# Patient Record
Sex: MALE | Race: BLACK OR AFRICAN AMERICAN | NOT HISPANIC OR LATINO | URBAN - METROPOLITAN AREA
[De-identification: names, ages, dates, MRNs, and addresses within clinical notes are randomized per-mention and may not be internally consistent; named-entity substitution may affect disease eponyms.]

---

## 2017-08-12 PROBLEM — Z00.00 ENCOUNTER FOR PREVENTIVE HEALTH EXAMINATION: Status: ACTIVE | Noted: 2017-08-12

## 2017-08-21 ENCOUNTER — OUTPATIENT (OUTPATIENT)
Dept: OUTPATIENT SERVICES | Facility: HOSPITAL | Age: 47
LOS: 1 days | End: 2017-08-21
Payer: COMMERCIAL

## 2017-08-21 VITALS
DIASTOLIC BLOOD PRESSURE: 94 MMHG | WEIGHT: 227.96 LBS | OXYGEN SATURATION: 97 % | SYSTOLIC BLOOD PRESSURE: 138 MMHG | TEMPERATURE: 98 F | RESPIRATION RATE: 18 BRPM | HEART RATE: 73 BPM | HEIGHT: 71 IN

## 2017-08-21 DIAGNOSIS — G47.33 OBSTRUCTIVE SLEEP APNEA (ADULT) (PEDIATRIC): ICD-10-CM

## 2017-08-21 DIAGNOSIS — Z01.818 ENCOUNTER FOR OTHER PREPROCEDURAL EXAMINATION: ICD-10-CM

## 2017-08-21 DIAGNOSIS — N47.1 PHIMOSIS: ICD-10-CM

## 2017-08-21 LAB
HCT VFR BLD CALC: 43.8 % — SIGNIFICANT CHANGE UP (ref 39–50)
HGB BLD-MCNC: 14.2 G/DL — SIGNIFICANT CHANGE UP (ref 13–17)
HIV 1+2 AB+HIV1 P24 AG SERPL QL IA: SIGNIFICANT CHANGE UP
MCHC RBC-ENTMCNC: 29 PG — SIGNIFICANT CHANGE UP (ref 27–34)
MCHC RBC-ENTMCNC: 32.4 GM/DL — SIGNIFICANT CHANGE UP (ref 32–36)
MCV RBC AUTO: 89.4 FL — SIGNIFICANT CHANGE UP (ref 80–100)
PLATELET # BLD AUTO: 307 K/UL — SIGNIFICANT CHANGE UP (ref 150–400)
RBC # BLD: 4.9 M/UL — SIGNIFICANT CHANGE UP (ref 4.2–5.8)
RBC # FLD: 13.7 % — SIGNIFICANT CHANGE UP (ref 10.3–14.5)
WBC # BLD: 8.53 K/UL — SIGNIFICANT CHANGE UP (ref 3.8–10.5)
WBC # FLD AUTO: 8.53 K/UL — SIGNIFICANT CHANGE UP (ref 3.8–10.5)

## 2017-08-21 PROCEDURE — 87389 HIV-1 AG W/HIV-1&-2 AB AG IA: CPT

## 2017-08-21 PROCEDURE — G0463: CPT

## 2017-08-21 PROCEDURE — 85027 COMPLETE CBC AUTOMATED: CPT

## 2017-08-21 RX ORDER — CEFAZOLIN SODIUM 1 G
2000 VIAL (EA) INJECTION ONCE
Qty: 0 | Refills: 0 | Status: DISCONTINUED | OUTPATIENT
Start: 2017-08-28 | End: 2017-09-12

## 2017-08-21 NOTE — H&P PST ADULT - NSANTHOSAYNRD_GEN_A_CORE
No. FELICITY screening performed.  STOP BANG Legend: 0-2 = LOW Risk; 3-4 = INTERMEDIATE Risk; 5-8 = HIGH Risk

## 2017-08-21 NOTE — H&P PST ADULT - NEUROLOGICAL DETAILS
sensation intact/alert and oriented x 3/responds to pain/responds to verbal commands/normal strength

## 2017-08-21 NOTE — H&P PST ADULT - HISTORY OF PRESENT ILLNESS
47 year old male presents for circumcision. Pt. reports experiencing foreskin irritation over the past few months.

## 2017-08-28 ENCOUNTER — OUTPATIENT (OUTPATIENT)
Dept: OUTPATIENT SERVICES | Facility: HOSPITAL | Age: 47
LOS: 1 days | End: 2017-08-28
Payer: COMMERCIAL

## 2017-08-28 ENCOUNTER — RESULT REVIEW (OUTPATIENT)
Age: 47
End: 2017-08-28

## 2017-08-28 VITALS
DIASTOLIC BLOOD PRESSURE: 72 MMHG | RESPIRATION RATE: 16 BRPM | OXYGEN SATURATION: 99 % | SYSTOLIC BLOOD PRESSURE: 110 MMHG | HEART RATE: 76 BPM

## 2017-08-28 VITALS
HEART RATE: 79 BPM | TEMPERATURE: 99 F | RESPIRATION RATE: 18 BRPM | SYSTOLIC BLOOD PRESSURE: 124 MMHG | HEIGHT: 71 IN | DIASTOLIC BLOOD PRESSURE: 82 MMHG | OXYGEN SATURATION: 98 % | WEIGHT: 229.94 LBS

## 2017-08-28 DIAGNOSIS — N47.1 PHIMOSIS: ICD-10-CM

## 2017-08-28 PROCEDURE — 88304 TISSUE EXAM BY PATHOLOGIST: CPT | Mod: 26

## 2017-08-28 PROCEDURE — 54160 CIRCUMCISION NEONATE: CPT

## 2017-08-28 PROCEDURE — 88304 TISSUE EXAM BY PATHOLOGIST: CPT

## 2017-08-28 RX ORDER — OXYCODONE AND ACETAMINOPHEN 5; 325 MG/1; MG/1
1 TABLET ORAL
Qty: 20 | Refills: 0
Start: 2017-08-28

## 2017-08-28 RX ORDER — SODIUM CHLORIDE 9 MG/ML
1000 INJECTION INTRAMUSCULAR; INTRAVENOUS; SUBCUTANEOUS
Qty: 0 | Refills: 0 | Status: DISCONTINUED | OUTPATIENT
Start: 2017-08-28 | End: 2017-09-12

## 2017-08-28 RX ORDER — SODIUM CHLORIDE 9 MG/ML
3 INJECTION INTRAMUSCULAR; INTRAVENOUS; SUBCUTANEOUS EVERY 8 HOURS
Qty: 0 | Refills: 0 | Status: DISCONTINUED | OUTPATIENT
Start: 2017-08-28 | End: 2017-08-28

## 2017-08-28 RX ORDER — FENTANYL CITRATE 50 UG/ML
50 INJECTION INTRAVENOUS
Qty: 0 | Refills: 0 | Status: DISCONTINUED | OUTPATIENT
Start: 2017-08-28 | End: 2017-08-28

## 2017-08-28 RX ORDER — LIDOCAINE HCL 20 MG/ML
0.2 VIAL (ML) INJECTION ONCE
Qty: 0 | Refills: 0 | Status: DISCONTINUED | OUTPATIENT
Start: 2017-08-28 | End: 2017-08-28

## 2017-08-28 RX ORDER — ONDANSETRON 8 MG/1
4 TABLET, FILM COATED ORAL ONCE
Qty: 0 | Refills: 0 | Status: DISCONTINUED | OUTPATIENT
Start: 2017-08-28 | End: 2017-08-28

## 2017-08-28 RX ADMIN — SODIUM CHLORIDE 125 MILLILITER(S): 9 INJECTION INTRAMUSCULAR; INTRAVENOUS; SUBCUTANEOUS at 23:03

## 2017-08-28 NOTE — ASU DISCHARGE PLAN (ADULT/PEDIATRIC). - MEDICATION SUMMARY - MEDICATIONS TO TAKE
I will START or STAY ON the medications listed below when I get home from the hospital:    Percocet 5/325 oral tablet  -- 1 tab(s) by mouth every 6 hours MDD:6 tabs  -- Caution federal law prohibits the transfer of this drug to any person other  than the person for whom it was prescribed.  May cause drowsiness.  Alcohol may intensify this effect.  Use care when operating dangerous machinery.  This prescription cannot be refilled.  This product contains acetaminophen.  Do not use  with any other product containing acetaminophen to prevent possible liver damage.  Using more of this medication than prescribed may cause serious breathing problems.    -- Indication: For For severe pain.

## 2017-08-30 ENCOUNTER — TRANSCRIPTION ENCOUNTER (OUTPATIENT)
Age: 47
End: 2017-08-30

## 2017-08-30 LAB — SURGICAL PATHOLOGY STUDY: SIGNIFICANT CHANGE UP

## 2017-11-02 ENCOUNTER — EMERGENCY (EMERGENCY)
Facility: HOSPITAL | Age: 47
LOS: 1 days | Discharge: HOME | End: 2017-11-02
Attending: EMERGENCY MEDICINE | Admitting: EMERGENCY MEDICINE
Payer: COMMERCIAL

## 2017-11-02 VITALS
DIASTOLIC BLOOD PRESSURE: 91 MMHG | RESPIRATION RATE: 16 BRPM | SYSTOLIC BLOOD PRESSURE: 145 MMHG | OXYGEN SATURATION: 100 % | TEMPERATURE: 98 F | HEART RATE: 90 BPM

## 2017-11-02 PROCEDURE — 73562 X-RAY EXAM OF KNEE 3: CPT | Mod: 26,RT

## 2017-11-02 PROCEDURE — 99285 EMERGENCY DEPT VISIT HI MDM: CPT

## 2017-11-02 RX ORDER — OXYCODONE AND ACETAMINOPHEN 5; 325 MG/1; MG/1
1 TABLET ORAL ONCE
Qty: 0 | Refills: 0 | Status: DISCONTINUED | OUTPATIENT
Start: 2017-11-02 | End: 2017-11-02

## 2017-11-02 RX ORDER — OXYCODONE AND ACETAMINOPHEN 5; 325 MG/1; MG/1
1 TABLET ORAL
Qty: 15 | Refills: 0
Start: 2017-11-02 | End: 2017-11-05

## 2017-11-02 RX ADMIN — OXYCODONE AND ACETAMINOPHEN 1 TABLET(S): 5; 325 TABLET ORAL at 11:42

## 2017-11-02 NOTE — ED PROVIDER NOTE - CARE PLAN
Instructions for follow-up, activity and diet:	Rest, keep extremity elevated whenever possible  Apply ice to affected area 15 min on/15 min off  Use ACE/splint/sling while active  Continue Indomethacin as directed  Take Percocet   Follow up with your PMD within 2-3 days  Return to the ER for worsening or fevers, chills. Principal Discharge DX:	Knee effusion  Instructions for follow-up, activity and diet:	Rest, keep extremity elevated whenever possible  Apply ice to affected area 15 min on/15 min off  Use ACE/splint/sling while active  Continue Indomethacin as directed  Take Percocet 1 tab every 6 hours as needed for severe pain  Follow up with your PMD within 2-3 days  Follow up with orthopedics- list provided  Return to the ER for worsening    If you have issues obtaining follow up, please call: [phone] to obtain a doctor or specialist who takes your insurance in your area. Principal Discharge DX:	Knee effusion  Instructions for follow-up, activity and diet:	Rest, keep extremity elevated whenever possible  Apply ice to affected area 15 min on/15 min off  Use ACE/splint/sling while active  Continue Indomethacin as directed  Take Percocet 1 tab every 6 hours as needed for severe pain  Follow up with your PMD within 2-3 days  Follow up with orthopedics- list provided  Return to the ER for worsening    If you have issues obtaining follow up, please call: [phone] to obtain a doctor or specialist who takes your insurance in your area.  Secondary Diagnosis:	Knee pain, unspecified chronicity, unspecified laterality

## 2017-11-02 NOTE — ED PROVIDER NOTE - PROGRESS NOTE DETAILS
ADRIANNA Taylor: Received signout and discussed plan with QUID attending. Xrays negative for fx, +joint effusion, pain well controlled. +rom. Stable for d/c home with ortho followup, ace, continue indomethacin and percocet. Pt amenable with plan. ADRIANNA Taylor: Received signout and discussed plan with QUID attending. Xrays negative for fx, +joint effusion, pain well controlled. +rom, effusion. No erythema/warmth. Stable for d/c home with ortho followup, ace, continue indomethacin and percocet. Pt amenable with plan.

## 2017-11-02 NOTE — ED ADULT TRIAGE NOTE - CHIEF COMPLAINT QUOTE
pt c/o swelling and pain to right knee x 2 weeks, worse since Sunday, seen in Urgent care on Tuesday given Indomethacin, not feeling better

## 2017-11-02 NOTE — ED PROVIDER NOTE - PLAN OF CARE
Rest, keep extremity elevated whenever possible  Apply ice to affected area 15 min on/15 min off  Use ACE/splint/sling while active  Continue Indomethacin as directed  Take Percocet   Follow up with your PMD within 2-3 days  Return to the ER for worsening or fevers, chills. Rest, keep extremity elevated whenever possible  Apply ice to affected area 15 min on/15 min off  Use ACE/splint/sling while active  Continue Indomethacin as directed  Take Percocet 1 tab every 6 hours as needed for severe pain  Follow up with your PMD within 2-3 days  Follow up with orthopedics- list provided  Return to the ER for worsening    If you have issues obtaining follow up, please call: [phone] to obtain a doctor or specialist who takes your insurance in your area.

## 2017-11-02 NOTE — ED PROVIDER NOTE - MEDICAL DECISION MAKING DETAILS
48 y/o M w/ right knee pain and swelling s/p recent trauma. Vital signs stable. Will check knee XRs, Ace wrap, walking aids, percocet and ortho follow up. 48 y/o M w/ right knee pain and swelling s/p recent trauma, no warmth or erythema, no systemic symptoms, able to range passively. Vital signs stable. Will check knee XRs, Ace wrap, walking aids, pain ctrl and ortho follow up.

## 2017-11-02 NOTE — ED PROVIDER NOTE - OBJECTIVE STATEMENT
48 y/o M w/ PMHx of gout, presents to the ED c/o right knee pain and swelling x5 days. Pt states he woke up 4 days ago w/ right knee swelling. Pt notes he banged his knee on the dashboard in his car 1 week ago. Pt was seen at urgent care 2 days ago for fever and right knee pain, no fever at urgent care. Pt has been dx w/ gout in the past. Endorses use of Indomethacin w/ no relief. Denies fever, chills, numbness/tingling, weakness or any other complaints. [facility]: [id number] [facility] [place] [address] 46 y/o M w/ PMHx of gout, presents to the ED c/o right knee pain and swelling x5 days. Pt states he hit his knee on the dash in the car, experienced severe pain at time but was able to ambulate and pain improved. Woke up 4 days ago w/ right knee swelling and pain which has been progressively owrsening.  Pt was seen at urgent care 2 days ago for right knee pain, rx indomethacin. Pt has been dx w/ gout in the past but states this feels different as it is not warm or red, when he has gout, it usually is. Endorses use of Indomethacin w/ no relief. Denies fever, chills, numbness/tingling, weakness or any other complaints. [facility]: [id number] [facility] [place] [address]

## 2017-11-02 NOTE — ED PROVIDER NOTE - LOWER EXTREMITY EXAM, RIGHT
right knee w/ suprapatellar effusion, no overlying erythema, minimal warmth, unable to range actively secondary to pain, able to range passively w/ pain, negative anterior and posterior drawer, extensor mechanism intact, neurovascularly intact, D.P. pulses normal, sensation intact, no patellar tenderness right knee w/ suprapatellar effusion, active rom limited by pain, able to passively range fully with pain, no overlying erythema, minimal warmth, negative anterior and posterior drawer, extensor mechanism intact, neurovascularly intact, D.P. pulses normal, sensation intact, no patellar tenderness

## 2019-09-12 ENCOUNTER — EMERGENCY (EMERGENCY)
Facility: HOSPITAL | Age: 49
LOS: 1 days | Discharge: HOME | End: 2019-09-12
Attending: EMERGENCY MEDICINE | Admitting: EMERGENCY MEDICINE
Payer: COMMERCIAL

## 2019-09-12 VITALS
TEMPERATURE: 98 F | RESPIRATION RATE: 17 BRPM | HEART RATE: 92 BPM | DIASTOLIC BLOOD PRESSURE: 99 MMHG | OXYGEN SATURATION: 97 % | SYSTOLIC BLOOD PRESSURE: 159 MMHG

## 2019-09-12 PROCEDURE — 99283 EMERGENCY DEPT VISIT LOW MDM: CPT

## 2019-09-12 NOTE — ED ADULT TRIAGE NOTE - CHIEF COMPLAINT QUOTE
Pt states he had lower left wisdom toothed extracted today. Pt states bleeding has not stopped bleeding since 6pm. Pt actively bleeding in ED. Pt denies sob/dizziness/AC usage Pt states he had lower left wisdom toothed extracted today. Pt states incision has not stopped bleeding since 6pm. Pt actively bleeding in ED. Pt denies sob/dizziness/AC usage Pt states he had lower left wisdom tooth extracted today. Pt states incision has not stopped bleeding since 6pm. Pt actively bleeding in ED. Pt denies sob/dizziness/AC usage

## 2019-09-13 VITALS
HEART RATE: 81 BPM | SYSTOLIC BLOOD PRESSURE: 141 MMHG | DIASTOLIC BLOOD PRESSURE: 86 MMHG | RESPIRATION RATE: 16 BRPM | OXYGEN SATURATION: 100 % | TEMPERATURE: 98 F

## 2019-09-13 PROBLEM — M10.9 GOUT, UNSPECIFIED: Chronic | Status: ACTIVE | Noted: 2017-08-21

## 2019-09-13 RX ORDER — TRANEXAMIC ACID 100 MG/ML
5 INJECTION, SOLUTION INTRAVENOUS ONCE
Refills: 0 | Status: COMPLETED | OUTPATIENT
Start: 2019-09-13 | End: 2019-09-13

## 2019-09-13 RX ADMIN — TRANEXAMIC ACID 5 MILLILITER(S): 100 INJECTION, SOLUTION INTRAVENOUS at 04:03

## 2019-09-13 NOTE — ED PROVIDER NOTE - OBJECTIVE STATEMENT
49 Y M with no pmhx had L lower wisdom tooth extraction tonight. He states that a few hours ago started bleeding and could not get it to start. Pt is not taking any blood thinners.

## 2019-09-13 NOTE — PROGRESS NOTE ADULT - SUBJECTIVE AND OBJECTIVE BOX
Patient is a 49y old  Male who presents with a chief complaint of continued bleeding following extraction of #17 (lower left wisdom tooth) at approximately 5pm on 9/12/19    HPI: 48 y/o male states he had lower left wisdom tooth extracted 9/12 at approximately 5pm. Patient states he went home and extraction site did not stop bleeding. Pt actively bleeding in ED. Pt denies sob/dizziness/AC usage. Patient denies daily use of aspirin or other blood thinners.    PAST MEDICAL & SURGICAL HISTORY:  Gout  No significant past surgical history    MEDICATIONS  (STANDING): None    MEDICATIONS  (PRN): None    Allergies: No Known Allergies    FAMILY HISTORY: No pertinent family history in first degree relatives    *SOCIAL HISTORY: Patient states he is an occasional tobacco user    *Last Dental Visit: 9/12/19    Vital Signs Last 24 Hrs  T(C): 36.7 (12 Sep 2019 23:22), Max: 36.7 (12 Sep 2019 23:22)  T(F): 98.1 (12 Sep 2019 23:22), Max: 98.1 (12 Sep 2019 23:22)  HR: 80 (13 Sep 2019 04:37) (80 - 92)  BP: 186/120 (13 Sep 2019 04:37) (159/99 - 186/120)  BP(mean): --  RR: 16 (13 Sep 2019 04:37) (16 - 17)  SpO2: 99% (13 Sep 2019 04:37) (97% - 100%)    EOE:  TMJ (-) clicks                    ( -) pops                    ( - ) crepitus             Mandible FROM             Facial bones and MOM grossly intact             ( - ) trismus             ( - ) LAD             ( - ) swelling             ( - ) asymmetry             ( - ) palpation             ( - ) SOB             ( - ) dysphagia             ( - ) LOC    IOE:  permanent dentition: grossly intact           hard/soft palate:  ( - ) palatal torus           tongue/FOM WNL           labial/buccal mucosa WNL           ( - ) percussion           ( +  ) palpation. Extraction site #17 tender to palpation           ( - ) swelling     *DENTAL RADIOGRAPHS: None taken at this consult    ASSESSMENT: #17 extraction site active bleeding    PROCEDURE:  Patient states he does not remember any complications during extraction. Patient states he was instructed to use gauze pressure to maintain hemostasis. Patient states that around 6PM, site began actively bleeding and he was unable to achieve hemostasis and presented to ED. ED Resident stated that lidocaine with epi was administered, TXA, and gauze pressure with continued active bleeding and not being able to achieve hemostasis. Verbal consent provided. Limited clinical exam performed. Liver clot was observed in extraction site and irrigated out. Site #17 was irrigated with copious amount of sterile saline. Collagen plug placed in extraction site, 3-0 chromic gut suture used to place 1 figure 8 suture and 1 interrupted suture. Gauze pressure was applied. Hemostasis achieved. All without complications.    Patient was instructed to apply pressure for 20-30 minutes and change gauze every 30 minutes for the following 2 hours.  Advised patient to not expectorate forcefully, use straws, smoke or drink alcohol for 3 days. Advised patient the sutures will dissolve in 7 - 10 days. Soft food diet for 3 days. Advised pt to follow up with dentist who performed extraction. All questions answered and patient understands to maintain gauze pressure.     RECOMMENDATIONS:  1) Monitor patient for 2 hours in ED  2) Dental F/U with outpatient dentist who performed extraction   3) If bleeding continues or new symptoms arises, return to ED.     Keisha Yang DDS, #[id number]  Neyda Putnam DMD, #[id number]

## 2019-09-13 NOTE — ED PROVIDER NOTE - CLINICAL SUMMARY MEDICAL DECISION MAKING FREE TEXT BOX
49 Y M with bleeding from wisdom tooth extraction site, will place guaze with epi, if controlled will DC if not will attempt TXA.

## 2019-09-13 NOTE — ED PROVIDER NOTE - NSFOLLOWUPINSTRUCTIONS_ED_ALL_ED_FT
1) Please follow up with your dentist in 1-2 days.    2) Please avoid ibuprofen or naproxen for pain control. Please use tylenol and follow up with your dentist if your pain does not improve.     3) If you develop worsening bleeding, please return to the ED.

## 2019-09-13 NOTE — ED ADULT NURSE NOTE - CHIEF COMPLAINT QUOTE
Pt states he had lower left wisdom tooth extracted today. Pt states incision has not stopped bleeding since 6pm. Pt actively bleeding in ED. Pt denies sob/dizziness/AC usage

## 2019-09-13 NOTE — ED PROVIDER NOTE - PLAN OF CARE
You were bleeding from the site of your wisdom tooth extraction. You were seen by the dental team who placed 2 sutures with improvement in your bleeding. Please follow up with your dentist in 2 days. Please return to the ED if you develop increased bleeding.

## 2019-09-13 NOTE — ED ADULT NURSE NOTE - OBJECTIVE STATEMENT
Patient presents for oral bleeding since 1800 yesterday, patient reports tooth extraction left side wisdom tooth on bottom removed. Currently bleeding. Denies n/v/Dizziness/weakness. Took naproxen colchicine today. Hx Gout

## 2019-09-13 NOTE — ED PROVIDER NOTE - CARE PLAN
Principal Discharge DX:	Dental disorder Principal Discharge DX:	Dental disorder  Assessment and plan of treatment:	You were bleeding from the site of your wisdom tooth extraction. You were seen by the dental team who placed 2 sutures with improvement in your bleeding. Please follow up with your dentist in 2 days. Please return to the ED if you develop increased bleeding.

## 2019-09-13 NOTE — ED PROVIDER NOTE - PATIENT PORTAL LINK FT
You can access the FollowMyHealth Patient Portal offered by [facility] by registering at the following website: [email or web address]. By joining [facility]’s FollowMyHealth portal, you will also be able to view your health information using other applications (apps) compatible with our system.

## 2019-09-13 NOTE — ED PROVIDER NOTE - ATTENDING CONTRIBUTION TO CARE
HPI:  49 Y M with no pmhx had L lower wisdom tooth extraction tonight. He states that a few hours ago started bleeding and could not get it to start. Pt is not taking any blood thinners. No other complaints. No diff breathing, no diff swallowing, no hoarse voice, no F/C/N/V.   EXAM: NAD, gauze in mouth, left lower molar missing with pooling dark blood, no pus, no abscess. no stridor, speaking full sentences, lungs ctab, abd soft nontender.   MDM: concern for dental bleeding, not on blood thinners. Will trial epinephrine soaked gauze vs TXA soaked gauze. if not stopping will consult dental.

## 2019-09-13 NOTE — ED PROVIDER NOTE - PROGRESS NOTE DETAILS
Resident: Luis Briscoe Pt said that bleeding had been controlled but then he started bleeding again. Some clots in mouth but not with new active bleeding as well. Will pack with txa and re-evaluate again. Resident: Luis Small - Pt now feels like is rebleeding again, injected several locations with lido with epi, repacked area. If still bleeding in 20 min will need to call dental. Resident: Luis Small - Spoke with dental since pt is still bleeding. They are coming in and will be here about 730AM. The resident who is coming in is holding pager [phone] Dusty PGY3: Dental at bedside to evaluate patient. Dusty PGY3: Dental irrigated and placed 2 resorbable sutures. Will watch for 2 hours and if no further bleeding can DC with outpatient dental follow up.

## 2019-10-07 ENCOUNTER — APPOINTMENT (OUTPATIENT)
Dept: ORTHOPEDIC SURGERY | Facility: CLINIC | Age: 49
End: 2019-10-07

## 2021-09-29 NOTE — ED ADULT NURSE NOTE - NS PRO PASSIVE SMOKE EXP
Neymar and mom came into clinic for his monthly testosterone injection.  Medication was brought from home. 0.25 mL of testosterone enanthate injected into his left thigh, utilized pain ease spray, patient tolerated injection well, band-aid applied and discharged home with mom.     Lot: [id number]  EXP: 07/22  
Unknown

## 2023-07-11 ENCOUNTER — INPATIENT (INPATIENT)
Facility: HOSPITAL | Age: 53
LOS: 1 days | Discharge: HOME | End: 2023-07-13
Attending: HOSPITALIST | Admitting: HOSPITALIST
Payer: COMMERCIAL

## 2023-07-11 VITALS
DIASTOLIC BLOOD PRESSURE: 84 MMHG | TEMPERATURE: 98 F | SYSTOLIC BLOOD PRESSURE: 128 MMHG | RESPIRATION RATE: 20 BRPM | HEART RATE: 91 BPM | OXYGEN SATURATION: 99 %

## 2023-07-11 DIAGNOSIS — K92.1 MELENA: ICD-10-CM

## 2023-07-11 LAB
ALBUMIN SERPL ELPH-MCNC: 4.6 G/DL — SIGNIFICANT CHANGE UP (ref 3.3–5)
ALP SERPL-CCNC: 72 U/L — SIGNIFICANT CHANGE UP (ref 40–120)
ALT FLD-CCNC: 19 U/L — SIGNIFICANT CHANGE UP (ref 4–41)
ANION GAP SERPL CALC-SCNC: 16 MMOL/L — HIGH (ref 7–14)
APTT BLD: 30 SEC — SIGNIFICANT CHANGE UP (ref 27–36.3)
AST SERPL-CCNC: 19 U/L — SIGNIFICANT CHANGE UP (ref 4–40)
BASOPHILS # BLD AUTO: 0.04 K/UL — SIGNIFICANT CHANGE UP (ref 0–0.2)
BASOPHILS NFR BLD AUTO: 0.4 % — SIGNIFICANT CHANGE UP (ref 0–2)
BILIRUB SERPL-MCNC: <0.2 MG/DL — SIGNIFICANT CHANGE UP (ref 0.2–1.2)
BLD GP AB SCN SERPL QL: NEGATIVE — SIGNIFICANT CHANGE UP
BUN SERPL-MCNC: 13 MG/DL — SIGNIFICANT CHANGE UP (ref 7–23)
CALCIUM SERPL-MCNC: 9.5 MG/DL — SIGNIFICANT CHANGE UP (ref 8.4–10.5)
CHLORIDE SERPL-SCNC: 101 MMOL/L — SIGNIFICANT CHANGE UP (ref 98–107)
CO2 SERPL-SCNC: 22 MMOL/L — SIGNIFICANT CHANGE UP (ref 22–31)
CREAT SERPL-MCNC: 0.85 MG/DL — SIGNIFICANT CHANGE UP (ref 0.5–1.3)
EGFR: 105 ML/MIN/1.73M2 — SIGNIFICANT CHANGE UP
EOSINOPHIL # BLD AUTO: 0.2 K/UL — SIGNIFICANT CHANGE UP (ref 0–0.5)
EOSINOPHIL NFR BLD AUTO: 2.1 % — SIGNIFICANT CHANGE UP (ref 0–6)
GLUCOSE BLDC GLUCOMTR-MCNC: 138 MG/DL — HIGH (ref 70–99)
GLUCOSE SERPL-MCNC: 139 MG/DL — HIGH (ref 70–99)
HCT VFR BLD CALC: 31.3 % — LOW (ref 39–50)
HGB BLD-MCNC: 8.2 G/DL — LOW (ref 13–17)
IANC: 5.03 K/UL — SIGNIFICANT CHANGE UP (ref 1.8–7.4)
IMM GRANULOCYTES NFR BLD AUTO: 0.3 % — SIGNIFICANT CHANGE UP (ref 0–0.9)
INR BLD: 1.03 RATIO — SIGNIFICANT CHANGE UP (ref 0.88–1.16)
LYMPHOCYTES # BLD AUTO: 3.6 K/UL — HIGH (ref 1–3.3)
LYMPHOCYTES # BLD AUTO: 37.4 % — SIGNIFICANT CHANGE UP (ref 13–44)
MCHC RBC-ENTMCNC: 17.8 PG — LOW (ref 27–34)
MCHC RBC-ENTMCNC: 26.2 GM/DL — LOW (ref 32–36)
MCV RBC AUTO: 67.9 FL — LOW (ref 80–100)
MONOCYTES # BLD AUTO: 0.72 K/UL — SIGNIFICANT CHANGE UP (ref 0–0.9)
MONOCYTES NFR BLD AUTO: 7.5 % — SIGNIFICANT CHANGE UP (ref 2–14)
NEUTROPHILS # BLD AUTO: 5.03 K/UL — SIGNIFICANT CHANGE UP (ref 1.8–7.4)
NEUTROPHILS NFR BLD AUTO: 52.3 % — SIGNIFICANT CHANGE UP (ref 43–77)
NRBC # BLD: 0 /100 WBCS — SIGNIFICANT CHANGE UP (ref 0–0)
NRBC # FLD: 0.02 K/UL — HIGH (ref 0–0)
OB PNL STL: NEGATIVE — SIGNIFICANT CHANGE UP
PLATELET # BLD AUTO: 395 K/UL — SIGNIFICANT CHANGE UP (ref 150–400)
POTASSIUM SERPL-MCNC: 3.6 MMOL/L — SIGNIFICANT CHANGE UP (ref 3.5–5.3)
POTASSIUM SERPL-SCNC: 3.6 MMOL/L — SIGNIFICANT CHANGE UP (ref 3.5–5.3)
PROT SERPL-MCNC: 7.1 G/DL — SIGNIFICANT CHANGE UP (ref 6–8.3)
PROTHROM AB SERPL-ACNC: 11.9 SEC — SIGNIFICANT CHANGE UP (ref 10.5–13.4)
RBC # BLD: 4.61 M/UL — SIGNIFICANT CHANGE UP (ref 4.2–5.8)
RBC # FLD: 20.3 % — HIGH (ref 10.3–14.5)
RH IG SCN BLD-IMP: POSITIVE — SIGNIFICANT CHANGE UP
SODIUM SERPL-SCNC: 139 MMOL/L — SIGNIFICANT CHANGE UP (ref 135–145)
WBC # BLD: 9.62 K/UL — SIGNIFICANT CHANGE UP (ref 3.8–10.5)
WBC # FLD AUTO: 9.62 K/UL — SIGNIFICANT CHANGE UP (ref 3.8–10.5)

## 2023-07-11 PROCEDURE — 99285 EMERGENCY DEPT VISIT HI MDM: CPT

## 2023-07-11 RX ORDER — PANTOPRAZOLE SODIUM 20 MG/1
80 TABLET, DELAYED RELEASE ORAL ONCE
Refills: 0 | Status: COMPLETED | OUTPATIENT
Start: 2023-07-11 | End: 2023-07-11

## 2023-07-11 RX ADMIN — PANTOPRAZOLE SODIUM 80 MILLIGRAM(S): 20 TABLET, DELAYED RELEASE ORAL at 20:42

## 2023-07-11 NOTE — ED ADULT NURSE NOTE - NSFALLUNIVINTERV_ED_ALL_ED
Bed/Stretcher in lowest position, wheels locked, appropriate side rails in place/Call bell, personal items and telephone in reach/Instruct patient to call for assistance before getting out of bed/chair/stretcher/Non-slip footwear applied when patient is off stretcher/[place] to call system/Physically safe environment - no spills, clutter or unnecessary equipment/Purposeful proactive rounding/Room/bathroom lighting operational, light cord in reach

## 2023-07-11 NOTE — ED PROVIDER NOTE - OBJECTIVE STATEMENT
52 yo M PMHx of HTN, DM presents with anemia on outpatient labs. Patient was found to have hemoglobin of 7.8 on recent labs by PCP. Patient endorses having melena for last several weeks, worse when patient takes ibuprofen for gout. Patient is not on any blood thinners. He does not endorse any abdominal pain currently. No nausea, vomiting, fevers. He sometimes has left sided abdominal pain with defecation. He is an active 30-40 pack year smoker. He also drinks 3-4 drinks per night. Family hx of prostate and lung cancer.

## 2023-07-11 NOTE — PATIENT PROFILE ADULT - NSPROHMSYMPCOND_GEN_A_NUR
Detail Level: Detailed
Quality 226: Preventive Care And Screening: Tobacco Use: Screening And Cessation Intervention: Patient screened for tobacco use and is an ex/non-smoker
Quality 130: Documentation Of Current Medications In The Medical Record: Current Medications Documented
Quality 431: Preventive Care And Screening: Unhealthy Alcohol Use - Screening: Patient not identified as an unhealthy alcohol user when screened for unhealthy alcohol use using a systematic screening method
cardiovascular

## 2023-07-11 NOTE — ED ADULT NURSE NOTE - HOW OFTEN DO YOU HAVE A DRINK CONTAINING ALCOHOL?
Pt mother requesting to leave prior to being treated or seen by an ERP. Pt leaved triage in good condition. ER AMA form obtained at this time.   
no chills
Never

## 2023-07-11 NOTE — ED ADULT NURSE NOTE - AS PAIN REST
Impression: Tear film insufficiency of bilateral lacrimal glands: H04.123.  Plan: AFTS QID+, warm compresses daily 0 (no pain/absence of nonverbal indicators of pain)

## 2023-07-11 NOTE — ED PROVIDER NOTE - CLINICAL SUMMARY MEDICAL DECISION MAKING FREE TEXT BOX
52 yo M PMHx of HTN, DM, active smoker, ETOH use presents with low hgb and melena. No current abdominal tenderness. Will get labs, give protonix, admit for GI workup.

## 2023-07-11 NOTE — ED ADULT NURSE NOTE - OBJECTIVE STATEMENT
Pt received to rm27 , awake and alert, A&OX4, ambulatory. Reporting bloody stools over the last few weeks and outpt Hgb of 7.8. Reports SANDOVAL over the last few days. Respirations even and unlabored. No hx of anemia of blood transfusion. Denies CP, SOB, N/V, HA, dizziness, palpitations, blurry vision. 20G IV placed to  L AC . Bed in lowest position, call bell within reach. Safety maintained.

## 2023-07-11 NOTE — ED ADULT TRIAGE NOTE - CHIEF COMPLAINT QUOTE
Pt arrives from MD office. Pt sent for a hgb of 7.8 and to r/o GI bleed. Pt denies any blood thinner use.

## 2023-07-11 NOTE — ED PROVIDER NOTE - ATTENDING CONTRIBUTION TO CARE
No risk alerts present 53-year-old male past medical history hypertension, diabetes, daily alcohol use, chronic intermittent bloody stools presents for hemoglobin of 7.8.  Does report shortness of breath more recently.  He denies any abdominal pain nausea vomiting.  Also does report history of H. pylori in the past unclear if it was recent diagnosis or status posttreatment.  GI Dr. Satish mAin.  Patient does also smoke 1 pack/day.  He denies any chest pain, fevers, chills.  Exam as above  Rectal exam without evidence of gross blood.  EKG normal sinus rhythm 94 bpm normal axis, normal intervals, no significant ST elevation depression; no evidence of acute ischemia.  Patient with dark stools and anemia given history of alcohol use, possible H. pylori, concern for upper GI bleed.  Plan labs, PPI IV, transfuse as needed, admit.

## 2023-07-11 NOTE — PATIENT PROFILE ADULT - FUNCTIONAL ASSESSMENT - DAILY ACTIVITY 5.
Gabe Nazario is a 72 year old male presenting with a blood pressure check.    Do you monitor at home? yes    Patient states that his reading have been trending upward. 167-180/80's    Last BP: 140/82  Today's BP: 170/90  Home Cuff: 165/96    Current medications for this: None at this time    Any of the following sx: chest pain, palpitations, headaches, SOB, dizziness? SOB    Patient states that he has some SOB every since getting sick at the end of January.   4 = No assist / stand by assistance

## 2023-07-12 DIAGNOSIS — I10 ESSENTIAL (PRIMARY) HYPERTENSION: ICD-10-CM

## 2023-07-12 DIAGNOSIS — D62 ACUTE POSTHEMORRHAGIC ANEMIA: ICD-10-CM

## 2023-07-12 DIAGNOSIS — F10.10 ALCOHOL ABUSE, UNCOMPLICATED: ICD-10-CM

## 2023-07-12 DIAGNOSIS — K92.1 MELENA: ICD-10-CM

## 2023-07-12 DIAGNOSIS — E78.5 HYPERLIPIDEMIA, UNSPECIFIED: ICD-10-CM

## 2023-07-12 DIAGNOSIS — M10.9 GOUT, UNSPECIFIED: ICD-10-CM

## 2023-07-12 DIAGNOSIS — Z29.9 ENCOUNTER FOR PROPHYLACTIC MEASURES, UNSPECIFIED: ICD-10-CM

## 2023-07-12 DIAGNOSIS — E11.65 TYPE 2 DIABETES MELLITUS WITH HYPERGLYCEMIA: ICD-10-CM

## 2023-07-12 DIAGNOSIS — D64.9 ANEMIA, UNSPECIFIED: ICD-10-CM

## 2023-07-12 LAB
FERRITIN SERPL-MCNC: 10 NG/ML — LOW (ref 30–400)
GLUCOSE BLDC GLUCOMTR-MCNC: 141 MG/DL — HIGH (ref 70–99)
GLUCOSE BLDC GLUCOMTR-MCNC: 141 MG/DL — HIGH (ref 70–99)
GLUCOSE BLDC GLUCOMTR-MCNC: 152 MG/DL — HIGH (ref 70–99)
GLUCOSE BLDC GLUCOMTR-MCNC: 153 MG/DL — HIGH (ref 70–99)
GLUCOSE BLDC GLUCOMTR-MCNC: 179 MG/DL — HIGH (ref 70–99)
HCT VFR BLD CALC: 30.8 % — LOW (ref 39–50)
HGB BLD-MCNC: 8.1 G/DL — LOW (ref 13–17)
IRON SATN MFR SERPL: 33 UG/DL — LOW (ref 45–165)
IRON SATN MFR SERPL: 6 % — LOW (ref 14–50)
MCHC RBC-ENTMCNC: 17.8 PG — LOW (ref 27–34)
MCHC RBC-ENTMCNC: 26.3 GM/DL — LOW (ref 32–36)
MCV RBC AUTO: 67.7 FL — LOW (ref 80–100)
NRBC # BLD: 0 /100 WBCS — SIGNIFICANT CHANGE UP (ref 0–0)
NRBC # FLD: 0.02 K/UL — HIGH (ref 0–0)
PLATELET # BLD AUTO: 405 K/UL — HIGH (ref 150–400)
RBC # BLD: 4.55 M/UL — SIGNIFICANT CHANGE UP (ref 4.2–5.8)
RBC # FLD: 19.9 % — HIGH (ref 10.3–14.5)
TIBC SERPL-MCNC: 565 UG/DL — HIGH (ref 220–430)
UIBC SERPL-MCNC: 532 UG/DL — HIGH (ref 110–370)
WBC # BLD: 8.44 K/UL — SIGNIFICANT CHANGE UP (ref 3.8–10.5)
WBC # FLD AUTO: 8.44 K/UL — SIGNIFICANT CHANGE UP (ref 3.8–10.5)

## 2023-07-12 PROCEDURE — 99223 1ST HOSP IP/OBS HIGH 75: CPT

## 2023-07-12 PROCEDURE — 99222 1ST HOSP IP/OBS MODERATE 55: CPT | Mod: GC

## 2023-07-12 PROCEDURE — 12345: CPT | Mod: NC,GC

## 2023-07-12 PROCEDURE — 99233 SBSQ HOSP IP/OBS HIGH 50: CPT

## 2023-07-12 RX ORDER — ALLOPURINOL 300 MG
1 TABLET ORAL
Refills: 0 | DISCHARGE

## 2023-07-12 RX ORDER — FERROUS SULFATE 325(65) MG
325 TABLET ORAL DAILY
Refills: 0 | Status: DISCONTINUED | OUTPATIENT
Start: 2023-07-12 | End: 2023-07-12

## 2023-07-12 RX ORDER — LANOLIN ALCOHOL/MO/W.PET/CERES
3 CREAM (GRAM) TOPICAL AT BEDTIME
Refills: 0 | Status: DISCONTINUED | OUTPATIENT
Start: 2023-07-12 | End: 2023-07-13

## 2023-07-12 RX ORDER — ALLOPURINOL 300 MG
300 TABLET ORAL DAILY
Refills: 0 | Status: DISCONTINUED | OUTPATIENT
Start: 2023-07-12 | End: 2023-07-13

## 2023-07-12 RX ORDER — DEXTROSE 50 % IN WATER 50 %
15 SYRINGE (ML) INTRAVENOUS ONCE
Refills: 0 | Status: DISCONTINUED | OUTPATIENT
Start: 2023-07-12 | End: 2023-07-13

## 2023-07-12 RX ORDER — AMLODIPINE BESYLATE 2.5 MG/1
1 TABLET ORAL
Refills: 0 | DISCHARGE

## 2023-07-12 RX ORDER — FOLIC ACID 0.8 MG
1 TABLET ORAL DAILY
Refills: 0 | Status: DISCONTINUED | OUTPATIENT
Start: 2023-07-12 | End: 2023-07-13

## 2023-07-12 RX ORDER — DEXTROSE 50 % IN WATER 50 %
12.5 SYRINGE (ML) INTRAVENOUS ONCE
Refills: 0 | Status: DISCONTINUED | OUTPATIENT
Start: 2023-07-12 | End: 2023-07-13

## 2023-07-12 RX ORDER — GLUCAGON INJECTION, SOLUTION 0.5 MG/.1ML
1 INJECTION, SOLUTION SUBCUTANEOUS ONCE
Refills: 0 | Status: DISCONTINUED | OUTPATIENT
Start: 2023-07-12 | End: 2023-07-13

## 2023-07-12 RX ORDER — INSULIN LISPRO 100/ML
VIAL (ML) SUBCUTANEOUS
Refills: 0 | Status: DISCONTINUED | OUTPATIENT
Start: 2023-07-12 | End: 2023-07-13

## 2023-07-12 RX ORDER — ACETAMINOPHEN 500 MG
650 TABLET ORAL EVERY 6 HOURS
Refills: 0 | Status: DISCONTINUED | OUTPATIENT
Start: 2023-07-12 | End: 2023-07-13

## 2023-07-12 RX ORDER — AMLODIPINE BESYLATE 2.5 MG/1
5 TABLET ORAL DAILY
Refills: 0 | Status: DISCONTINUED | OUTPATIENT
Start: 2023-07-12 | End: 2023-07-12

## 2023-07-12 RX ORDER — DEXTROSE 50 % IN WATER 50 %
25 SYRINGE (ML) INTRAVENOUS ONCE
Refills: 0 | Status: DISCONTINUED | OUTPATIENT
Start: 2023-07-12 | End: 2023-07-13

## 2023-07-12 RX ORDER — INSULIN LISPRO 100/ML
VIAL (ML) SUBCUTANEOUS AT BEDTIME
Refills: 0 | Status: DISCONTINUED | OUTPATIENT
Start: 2023-07-12 | End: 2023-07-13

## 2023-07-12 RX ORDER — PANTOPRAZOLE SODIUM 20 MG/1
40 TABLET, DELAYED RELEASE ORAL EVERY 12 HOURS
Refills: 0 | Status: DISCONTINUED | OUTPATIENT
Start: 2023-07-12 | End: 2023-07-12

## 2023-07-12 RX ORDER — SODIUM CHLORIDE 9 MG/ML
1000 INJECTION, SOLUTION INTRAVENOUS
Refills: 0 | Status: DISCONTINUED | OUTPATIENT
Start: 2023-07-12 | End: 2023-07-13

## 2023-07-12 RX ORDER — THIAMINE MONONITRATE (VIT B1) 100 MG
100 TABLET ORAL DAILY
Refills: 0 | Status: DISCONTINUED | OUTPATIENT
Start: 2023-07-12 | End: 2023-07-13

## 2023-07-12 RX ORDER — PANTOPRAZOLE SODIUM 20 MG/1
8 TABLET, DELAYED RELEASE ORAL
Qty: 80 | Refills: 0 | Status: DISCONTINUED | OUTPATIENT
Start: 2023-07-12 | End: 2023-07-13

## 2023-07-12 RX ADMIN — Medication 1 MILLIGRAM(S): at 12:33

## 2023-07-12 RX ADMIN — Medication 1: at 12:25

## 2023-07-12 RX ADMIN — Medication 1: at 08:43

## 2023-07-12 RX ADMIN — PANTOPRAZOLE SODIUM 10 MG/HR: 20 TABLET, DELAYED RELEASE ORAL at 13:33

## 2023-07-12 RX ADMIN — Medication 100 MILLIGRAM(S): at 08:44

## 2023-07-12 RX ADMIN — Medication 1 TABLET(S): at 12:26

## 2023-07-12 RX ADMIN — Medication 300 MILLIGRAM(S): at 12:26

## 2023-07-12 RX ADMIN — PANTOPRAZOLE SODIUM 40 MILLIGRAM(S): 20 TABLET, DELAYED RELEASE ORAL at 08:44

## 2023-07-12 NOTE — H&P ADULT - PROBLEM SELECTOR PLAN 4
Recent A1C 6.7 on outpatient labs. Not yet on meds  -low dose ISS  -f/u PMD regarding DM med management

## 2023-07-12 NOTE — H&P ADULT - NSHPPHYSICALEXAM_GEN_ALL_CORE
PHYSICAL EXAM:  Vital Signs Last 24 Hrs  T(C): 36.9 (07-11-23 @ 22:56)  T(F): 98.4 (07-11-23 @ 22:56), Max: 99.1 (07-11-23 @ 20:00)  HR: 90 (07-11-23 @ 22:56) (80 - 91)  BP: 125/76 (07-11-23 @ 22:56)  BP(mean): --  RR: 18 (07-11-23 @ 22:56) (18 - 20)  SpO2: 100% (07-11-23 @ 22:56) (99% - 100%)  Wt(kg): --    Constitutional: NAD, awake and alert, well developed  EYES: EOMI, conjunctiva clear  ENT:  Normal Hearing, no tonsillar exudates   Neck: Soft and supple , no thyromegaly   Respiratory: Breath sounds are clear bilaterally, No wheezing, rales or rhonchi, no tachypnea, no accessory muscle use  Cardiovascular: S1 and S2, regular rate and rhythm, no Murmurs, gallops or rubs, no JVD, no leg edema  Gastrointestinal: Bowel Sounds present, soft, nontender, nondistended, no guarding, no rebound  Extremities: No cyanosis or clubbing; warm to touch  Vascular: 2+ peripheral pulses lower ex  Neurological: No focal deficits, CN II-XII intact bilaterally, sensation to light touch intact in all extremities.  Musculoskeletal: 5/5 strength b/l upper and lower extremities; no joint swelling.  Skin: No rashes, no ulcerations

## 2023-07-12 NOTE — PROGRESS NOTE ADULT - PROBLEM SELECTOR PLAN 7
3-4 drinks daily, last yesterday. Denies hx ETOH withdrawal. No hx varices. Current CIWA 0  -symptom triggered CIWA for now  -ETOH cessation advised Not on meds  F/u PMD

## 2023-07-12 NOTE — H&P ADULT - PROBLEM SELECTOR PLAN 7
3-4 drinks daily, last yesterday. Denies hx ETOH withdrawal. No hx varices. Current CIWA 0  -symptom triggered CIWA for now 3-4 drinks daily, last yesterday. Denies hx ETOH withdrawal. No hx varices. Current CIWA 0  -symptom triggered CIWA for now  -ETOH cessation advised

## 2023-07-12 NOTE — H&P ADULT - HISTORY OF PRESENT ILLNESS
53M with PMHx HTN, DM2, gout, HLD, hx H. pylori 2/2022 s/p treatment presenting with anemia on outpatient labs and melena. Patient notes black stool almost daily for the last several weeks which he has never had before. He endorses prior episode of BRBPR but none now. He also has felt more fatigued and SOB with exertion. Denies CP, abdominal pain, vomiting, diarrhea, urinary changes. His last EGD/C-scope was 2/2022 which revealed H pylori and he received abx treatment for this. He states C-scope was normal. He followed up with his PCP and was told to come to the hospital after outpatient labs indicated hgb 7.8. He denies LH, dizziness, syncope but has some heart racing with exertion at times. His only meds include allopurinol and amlodipine. He is not on blood thinners. He took ibuprofen several weeks ago and began noticing melena at that time.     In ED, VSS. Hgb 8.2. Per ED, rectal without blood. FOBT neg.

## 2023-07-12 NOTE — PROGRESS NOTE ADULT - PROBLEM SELECTOR PLAN 5
Resume allopurinol 300mg qd Recent A1C 6.7 on outpatient labs. Not yet on meds  -low dose ISS  -f/u PMD regarding DM med management

## 2023-07-12 NOTE — H&P ADULT - NSHPREVIEWOFSYSTEMS_GEN_ALL_CORE
ROS:    Constitutional: [ ] fevers [ ] chills [x ] fatigue  HEENT: [ ] postnasal drip [ ] nasal congestion  CV: [ ] chest pain [ ] orthopnea   Resp: [ ] cough [x ] shortness of breath [x ] dyspnea   GI: [ ] nausea [ ] vomiting [ ] diarrhea [ ] constipation [ ] abd pain [x] melena  : [ ] dysuria [ ] increased urinary frequency  Musculoskeletal: [ ] back pain [ ] myalgias [ ] arthralgias [ ] fracture  Skin: [ ] rash [ ] itch  Neurological: [ ] headache [ ] dizziness [ ] syncope  Endocrine: [x ] diabetes [ ] thyroid problem  Hematologic/Lymphatic: [x ] anemia [ ] bleeding problem  [x ] All other systems negative

## 2023-07-12 NOTE — PROGRESS NOTE ADULT - PROBLEM SELECTOR PLAN 4
Recent A1C 6.7 on outpatient labs. Not yet on meds  -low dose ISS  -f/u PMD regarding DM med management BP stable  -resume amlodipine 5mg qd with hold parameters

## 2023-07-12 NOTE — CONSULT NOTE ADULT - ATTENDING COMMENTS
53M with PMHx HTN, DM2, gout, HLD, hx H. pylori 2/2022 s/p treatment without confirmed eradication presenting with anemia on outpatient labs and melena for several weeks.  Pt also with recent NSAID use, drinks alcohol routinely  JEAN with microcytosis    Imp:  UGIB likely PUD/NSAID/alcohol  r/o HP recurrence    Recs  1- Patient ate breakfast today, will plan on EGD in AM Thursday  2- PPI gtt  3- Clears today, NPO past midnight  4- Do not administer iron supplementation while inpatient

## 2023-07-12 NOTE — CONSULT NOTE ADULT - ASSESSMENT
53M with PMHx HTN, DM2, gout, HLD, hx H. pylori 2/2022 s/p treatment presenting with anemia on outpatient labs and melena for several weeks.  GI is consulted for GIB.    Assessment    #Melena  #Hx of H. pylori infection  #ETOH use  #Iron deficiency anemia    - Possible ddx: PUD, gastritis/esophagitis, r/o cirrhosis/varices (less likely)  - Currently stable, and last PO intake was 7/12 AM  - S/p treatment for H. Pylori, but not confirmed with eradication test  - On CIWA protocol, no prn requirement at this time, and no signs of withdrawal. Last drink was 7/10.   - Iron panel reviewed. Iron sat 6%    Recommendations:  - C/w Protonix 40 mg BID  - NPO after midnight for EGD 7/13  - D/c iron supplement while in the hospital for monitoring of melena  - Trend H&H and transfuse for Hgb < 7  - Avoid NSAIDs  - Rest of the care per primary team    Note incomplete until finalized by attending signature/attestation.    Cyndee Whitmore  GI/Hepatology Fellow    MONDAY-FRIDAY 8AM-5PM:  Pager# [phone] ([facility]) or [phone] ([facility])    NON-URGENT CONSULTS:  Please email [email or web address]  AT NIGHT AND ON WEEKENDS:  Contact on-call GI fellow via answering service ([phone]) from 5pm-8am and on weekends/holidays

## 2023-07-12 NOTE — PROGRESS NOTE ADULT - PROBLEM SELECTOR PLAN 2
Hgb 8.2. VSS. Microcytic anemia likely due to blood loss  -GI consult, add on iron labs, PPI BID In the setting of GI bleed and iron deficiency  -Iron on hold for now to assess if stools remain melanotic  -EGD 7/13  -Normotensive

## 2023-07-12 NOTE — PROGRESS NOTE ADULT - PROBLEM SELECTOR PLAN 3
BP stable  -resume amlodipine 5mg qd with hold parameters 4-5 double shot drinks daily, last 7/10. Denies hx ETOH withdrawal. No hx varices. Current CIWA 0  -symptom triggered CIWA for now  -Will provide referral to [facility] for EtOH cessation service

## 2023-07-12 NOTE — H&P ADULT - TIME BILLING
I had a face to face encounter with this patient. I spent 80 total minutes on the bedside interview and examination, review of outpatient records, coordination of care, counseling, chart review, order placement and documentation for this patient.

## 2023-07-12 NOTE — CONSULT NOTE ADULT - SUBJECTIVE AND OBJECTIVE BOX
HPI:  IMAN DOMINGUEZ is a 53M with PMHx HTN, DM2, gout, HLD, hx H. pylori 2/2022 s/p treatment presenting with anemia on outpatient labs and melena for several weeks.  GI is consulted for GIB.    Patient reported to notice darker stools since about 3 weeks ago. He had a gout flare and took high dose ibuprofen for 1-2 times, and noticed the stool got darker intermittently. Never noticed bright red blood or blood coating the stool. Stool color appeared to be dark brown/black, not sticky, greasy or foamy. No increase in stool frequency or caliber from baseline. Started to experience fatigue and exertional SOB, and was found to have low Hgb by PCP.   At baseline, patient has loose stool after breakfast 2-3 times a week. He generally wakes up from sleep around 4 AM to have a BM, and may have watery stool right after breakfast (regardless of types of food intake), no further BM's throughout the day. (+) known lactose intolerance. Patient also has intermittent acid reflux, taking anti-acid medications (TUMS, omeprazole) occasionally. He had 1 episode of BRBPR 2/2022, and was s/p EGD/colonoscopy that revealed (+) H. pylori infection. No findings on colonoscopy. Patient was treated for H. pylori, but never had eradication test afterwards.   Denied associated fevers, chills, weight loss, dysphagia, odynophagia, early satiety, poor oral intake, abdominal pain, nausea, vomiting, hematemesis, hematochezia, or family history of GI-related cancers.    Patient drinks 3-4 shots of hard liquor daily (occasionally more), and last drink was 7/10. He continued to drink while having dark stools. Had muscle cramps while trying to quit drinking x 5 days, but never had hallucinations, seizure, ICU admissions or intubation.     ROS:   General:  No fevers, chills, (+) fatigue  Eyes:  Good vision, no reported pain  ENT:  No sore throat, pain, runny nose  CV:  No pain, palpitations  Pulm:  No dyspnea, cough, (+) SANDOVAL  GI:  See HPI, otherwise negative  :  No  incontinence, nocturia  Muscle:  No pain, weakness  Neuro:  No memory problems  Psych:  No insomnia, depression  Endocrine:  No polyuria, polydipsia, cold/heat intolerance  Heme:  No petechiae, ecchymosis, easy bruisability  Skin:  No rash    PMHX/PSHX:    Gout    HTN (hypertension)    Type 2 diabetes mellitus with hyperglycemia, without long-term current use of insulin    HLD (hyperlipidemia)    No significant past surgical history      Allergies:  No Known Allergies      Home Medications: reviewed  Hospital Medications:  acetaminophen     Tablet .. 650 milliGRAM(s) Oral every 6 hours PRN  allopurinol 300 milliGRAM(s) Oral daily  dextrose 5%. 1000 milliLiter(s) IV Continuous <Continuous>  dextrose 5%. 1000 milliLiter(s) IV Continuous <Continuous>  dextrose 50% Injectable 25 Gram(s) IV Push once  dextrose 50% Injectable 12.5 Gram(s) IV Push once  dextrose 50% Injectable 25 Gram(s) IV Push once  dextrose Oral Gel 15 Gram(s) Oral once PRN  ferrous    sulfate 325 milliGRAM(s) Oral daily  folic acid 1 milliGRAM(s) Oral daily  glucagon  Injectable 1 milliGRAM(s) IntraMuscular once  insulin lispro (ADMELOG) corrective regimen sliding scale   SubCutaneous three times a day before meals  insulin lispro (ADMELOG) corrective regimen sliding scale   SubCutaneous at bedtime  LORazepam   Injectable 2 milliGRAM(s) IV Push every 2 hours PRN  melatonin 3 milliGRAM(s) Oral at bedtime PRN  multivitamin 1 Tablet(s) Oral daily  pantoprazole  Injectable 40 milliGRAM(s) IV Push every 12 hours  thiamine 100 milliGRAM(s) Oral daily      Social History:   Tobacco: denies  Alcohol: denies  Recreational drugs: denies    Family history:    No pertinent family history in first degree relatives      Denies family history of colon cancer/polyps, stomach cancer/polyps, pancreatic cancer/masses, liver cancer/disease, ovarian cancer and endometrial cancer.    PHYSICAL EXAM:   Vital Signs:  Vital Signs Last 24 Hrs  T(C): 36.6 (12 Jul 2023 08:30), Max: 37.3 (11 Jul 2023 20:00)  T(F): 97.8 (12 Jul 2023 08:30), Max: 99.1 (11 Jul 2023 20:00)  HR: 64 (12 Jul 2023 08:30) (64 - 91)  BP: 118/70 (12 Jul 2023 08:30) (100/67 - 128/84)  BP(mean): --  RR: 17 (12 Jul 2023 08:30) (17 - 20)  SpO2: 99% (12 Jul 2023 08:30) (99% - 100%)    Parameters below as of 12 Jul 2023 08:30  Patient On (Oxygen Delivery Method): room air      Daily Height in cm: 180.34 (11 Jul 2023 22:56)    Daily     GENERAL: no acute distress  NEURO: alert and oriented x 4  HEENT: NCAT, no conjunctival pallor appreciated  CHEST: no respiratory distress, no accessory muscle use  CARDIAC: regular rate, +S1/S2  ABDOMEN: soft, nontender, no rebound or guarding; rectal exam w/ normal tone, and no stool or blood in vault  EXTREMITIES: warm, well perfused  SKIN: no lesions noted    LABS: reviewed                        8.2    9.62  )-----------( 395      ( 11 Jul 2023 20:59 )             31.3     07-11    139  |  101  |  13  ----------------------------<  139<H>  3.6   |  22  |  0.85    Ca    9.5      11 Jul 2023 20:59    TPro  7.1  /  Alb  4.6  /  TBili  <0.2  /  DBili  x   /  AST  19  /  ALT  19  /  AlkPhos  72  07-11    LIVER FUNCTIONS - ( 11 Jul 2023 20:59 )  Alb: 4.6 g/dL / Pro: 7.1 g/dL / ALK PHOS: 72 U/L / ALT: 19 U/L / AST: 19 U/L / GGT: x               Diagnostic Studies: see sunrise for full report

## 2023-07-12 NOTE — PROGRESS NOTE ADULT - ASSESSMENT
53M with PMHx HTN, DM2, gout, HLD, hx H. pylori 2/2022 s/p treatment presenting with anemia on outpatient labs and melena for several weeks 53M with PMHx HTN, DM2, gout, HLD, hx H. pylori 2/2022 s/p treatment presenting with anemia on outpatient labs, admitted for shortness of breath and melena possibly 2/2 GI bleed vs iron deficiency anemia.

## 2023-07-12 NOTE — PROGRESS NOTE ADULT - PROBLEM SELECTOR PLAN 1
Melena x3 weeks. Hx prior NSAID use and daily ETOH use that can be contributing. Not on blood thinners  -advised patient on discontinuing ETOH and NSAIDs  -denies hematemesis, no hx varices  -IV PPI BID  -GI consult emailed  -CBC q12, active T&S, IV access  -transfuse hgb <7 Melena x3 weeks. Hx prior NSAID use and daily heavy EtOH use.  -Pt advised to discontinue EtOH use.   -IV PPI BID  -NPO after midnight for EGD 7/13  -CBC q12, active T&S, IV access  -transfuse hgb <7

## 2023-07-12 NOTE — H&P ADULT - ASSESSMENT
53M with PMHx HTN, DM2, gout, HLD, hx H. pylori 2/2022 s/p treatment presenting with anemia on outpatient labs and melena for several weeks

## 2023-07-12 NOTE — H&P ADULT - NSICDXPASTMEDICALHX_GEN_ALL_CORE_FT
PAST MEDICAL HISTORY:  Gout     HLD (hyperlipidemia)     HTN (hypertension)     Type 2 diabetes mellitus with hyperglycemia, without long-term current use of insulin

## 2023-07-12 NOTE — H&P ADULT - NSHPLABSRESULTS_GEN_ALL_CORE
Personally reviewed labs:                        8.2    9.62  )-----------( 395      ( 11 Jul 2023 20:59 )             31.3     07-11-23 @ 20:59    139  |  101  |  13             --------------------------< 139<H>     3.6  |  22  | 0.85    eGFR AA: --  eGFR N-AA: --    Calcium: 9.5  Phosphorus: --  Magnesium: --    AST: 19    ALT: 19  AlkPhos: 72  Protein: 7.1  Albumin: 4.6  TBili: <0.2  D-Bili: --          RADIOLOGY & ADDITIONAL TESTS:    EKG my independent interpretation: NSR, no ST changes

## 2023-07-12 NOTE — H&P ADULT - PROBLEM SELECTOR PLAN 1
Melena x3 weeks. Hx prior NSAID use and daily ETOH use that can be contributing. Not on blood thinners  -advised patient on discontinuing ETOH and NSAIDs  -denies hematemesis, no hx varices  -IV PPI BID  -GI consult emailed  -CBC q12, active T&S, IV access  -transfuse hgb <7

## 2023-07-13 ENCOUNTER — TRANSCRIPTION ENCOUNTER (OUTPATIENT)
Age: 53
End: 2023-07-13

## 2023-07-13 VITALS
TEMPERATURE: 98 F | HEART RATE: 66 BPM | OXYGEN SATURATION: 99 % | SYSTOLIC BLOOD PRESSURE: 111 MMHG | DIASTOLIC BLOOD PRESSURE: 69 MMHG | RESPIRATION RATE: 20 BRPM

## 2023-07-13 LAB
ALBUMIN SERPL ELPH-MCNC: 4.1 G/DL — SIGNIFICANT CHANGE UP (ref 3.3–5)
ALP SERPL-CCNC: 64 U/L — SIGNIFICANT CHANGE UP (ref 40–120)
ALT FLD-CCNC: 26 U/L — SIGNIFICANT CHANGE UP (ref 4–41)
ANION GAP SERPL CALC-SCNC: 6 MMOL/L — LOW (ref 7–14)
ANISOCYTOSIS BLD QL: SIGNIFICANT CHANGE UP
AST SERPL-CCNC: 30 U/L — SIGNIFICANT CHANGE UP (ref 4–40)
BASOPHILS # BLD AUTO: 0.04 K/UL — SIGNIFICANT CHANGE UP (ref 0–0.2)
BASOPHILS NFR BLD AUTO: 0.5 % — SIGNIFICANT CHANGE UP (ref 0–2)
BILIRUB SERPL-MCNC: <0.2 MG/DL — SIGNIFICANT CHANGE UP (ref 0.2–1.2)
BUN SERPL-MCNC: 9 MG/DL — SIGNIFICANT CHANGE UP (ref 7–23)
CALCIUM SERPL-MCNC: 8.8 MG/DL — SIGNIFICANT CHANGE UP (ref 8.4–10.5)
CHLORIDE SERPL-SCNC: 108 MMOL/L — HIGH (ref 98–107)
CO2 SERPL-SCNC: 23 MMOL/L — SIGNIFICANT CHANGE UP (ref 22–31)
CREAT SERPL-MCNC: 0.8 MG/DL — SIGNIFICANT CHANGE UP (ref 0.5–1.3)
EGFR: 107 ML/MIN/1.73M2 — SIGNIFICANT CHANGE UP
EOSINOPHIL # BLD AUTO: 0.25 K/UL — SIGNIFICANT CHANGE UP (ref 0–0.5)
EOSINOPHIL NFR BLD AUTO: 3.3 % — SIGNIFICANT CHANGE UP (ref 0–6)
GIANT PLATELETS BLD QL SMEAR: PRESENT — SIGNIFICANT CHANGE UP
GLUCOSE BLDC GLUCOMTR-MCNC: 160 MG/DL — HIGH (ref 70–99)
GLUCOSE BLDC GLUCOMTR-MCNC: 161 MG/DL — HIGH (ref 70–99)
GLUCOSE BLDC GLUCOMTR-MCNC: 172 MG/DL — HIGH (ref 70–99)
GLUCOSE BLDC GLUCOMTR-MCNC: 176 MG/DL — HIGH (ref 70–99)
GLUCOSE SERPL-MCNC: 154 MG/DL — HIGH (ref 70–99)
HCT VFR BLD CALC: 30.4 % — LOW (ref 39–50)
HGB BLD-MCNC: 7.8 G/DL — LOW (ref 13–17)
HYPOCHROMIA BLD QL: SIGNIFICANT CHANGE UP
IANC: 4.33 K/UL — SIGNIFICANT CHANGE UP (ref 1.8–7.4)
IMM GRANULOCYTES NFR BLD AUTO: 0.3 % — SIGNIFICANT CHANGE UP (ref 0–0.9)
LYMPHOCYTES # BLD AUTO: 2.35 K/UL — SIGNIFICANT CHANGE UP (ref 1–3.3)
LYMPHOCYTES # BLD AUTO: 31 % — SIGNIFICANT CHANGE UP (ref 13–44)
MAGNESIUM SERPL-MCNC: 1.9 MG/DL — SIGNIFICANT CHANGE UP (ref 1.6–2.6)
MANUAL SMEAR VERIFICATION: SIGNIFICANT CHANGE UP
MCHC RBC-ENTMCNC: 17.4 PG — LOW (ref 27–34)
MCHC RBC-ENTMCNC: 25.7 GM/DL — LOW (ref 32–36)
MCV RBC AUTO: 67.7 FL — LOW (ref 80–100)
MICROCYTES BLD QL: SIGNIFICANT CHANGE UP
MONOCYTES # BLD AUTO: 0.58 K/UL — SIGNIFICANT CHANGE UP (ref 0–0.9)
MONOCYTES NFR BLD AUTO: 7.7 % — SIGNIFICANT CHANGE UP (ref 2–14)
NEUTROPHILS # BLD AUTO: 4.33 K/UL — SIGNIFICANT CHANGE UP (ref 1.8–7.4)
NEUTROPHILS NFR BLD AUTO: 57.2 % — SIGNIFICANT CHANGE UP (ref 43–77)
NRBC # BLD: 0 /100 WBCS — SIGNIFICANT CHANGE UP (ref 0–0)
NRBC # FLD: 0.02 K/UL — HIGH (ref 0–0)
PHOSPHATE SERPL-MCNC: 2.7 MG/DL — SIGNIFICANT CHANGE UP (ref 2.5–4.5)
PLAT MORPH BLD: ABNORMAL
PLATELET # BLD AUTO: 364 K/UL — SIGNIFICANT CHANGE UP (ref 150–400)
PLATELET COUNT - ESTIMATE: NORMAL — SIGNIFICANT CHANGE UP
POIKILOCYTOSIS BLD QL AUTO: SLIGHT — SIGNIFICANT CHANGE UP
POLYCHROMASIA BLD QL SMEAR: SLIGHT — SIGNIFICANT CHANGE UP
POTASSIUM SERPL-MCNC: 3.8 MMOL/L — SIGNIFICANT CHANGE UP (ref 3.5–5.3)
POTASSIUM SERPL-SCNC: 3.8 MMOL/L — SIGNIFICANT CHANGE UP (ref 3.5–5.3)
PROT SERPL-MCNC: 6.5 G/DL — SIGNIFICANT CHANGE UP (ref 6–8.3)
RBC # BLD: 4.49 M/UL — SIGNIFICANT CHANGE UP (ref 4.2–5.8)
RBC # FLD: 20.2 % — HIGH (ref 10.3–14.5)
RBC BLD AUTO: ABNORMAL
SODIUM SERPL-SCNC: 137 MMOL/L — SIGNIFICANT CHANGE UP (ref 135–145)
STOMATOCYTES BLD QL SMEAR: SLIGHT — SIGNIFICANT CHANGE UP
WBC # BLD: 7.57 K/UL — SIGNIFICANT CHANGE UP (ref 3.8–10.5)
WBC # FLD AUTO: 7.57 K/UL — SIGNIFICANT CHANGE UP (ref 3.8–10.5)

## 2023-07-13 PROCEDURE — 99239 HOSP IP/OBS DSCHRG MGMT >30: CPT | Mod: GC

## 2023-07-13 PROCEDURE — 43239 EGD BIOPSY SINGLE/MULTIPLE: CPT | Mod: GC

## 2023-07-13 RX ORDER — FERROUS SULFATE 325(65) MG
1 TABLET ORAL
Qty: 30 | Refills: 3
Start: 2023-07-13 | End: 2023-11-09

## 2023-07-13 RX ORDER — INSULIN LISPRO 100/ML
VIAL (ML) SUBCUTANEOUS EVERY 6 HOURS
Refills: 0 | Status: DISCONTINUED | OUTPATIENT
Start: 2023-07-13 | End: 2023-07-13

## 2023-07-13 RX ADMIN — Medication 1: at 13:16

## 2023-07-13 RX ADMIN — Medication 1: at 06:36

## 2023-07-13 NOTE — DISCHARGE NOTE NURSING/CASE MANAGEMENT/SOCIAL WORK - NSDCPEFALRISK_GEN_ALL_CORE
For information on Fall & Injury Prevention, visit: [email or web address] OR  [email or web address] OR  https://www.cdc.gov/steadi/patient.html

## 2023-07-13 NOTE — PROGRESS NOTE ADULT - ATTENDING COMMENTS
EGD identified no source of bleeding. Pt asymptomatic, hemodynamically stable with reassuring lab work. He declines inpatient colonoscopy as offered by GI and will f/u with his outpatient GI. Time planning discharge 35 minutes.
Presentation suggests UGIB. Currently hemodynamically stable with adequate hgb. Monitor closely. Appreciate GI consult. Anticipate EGD tomorrow. Continue PPI gtt at this time.

## 2023-07-13 NOTE — PROGRESS NOTE ADULT - PROBLEM SELECTOR PLAN 1
Melena x3 weeks. Hx prior NSAID use and daily heavy EtOH use.  -Pt advised to discontinue EtOH use.   -EGD unremarkable  -Bleeding resolved as no longer having melanotic stools  -Colonoscopy to be performed by outpatient GI doctor (patient preference)

## 2023-07-13 NOTE — PROGRESS NOTE ADULT - ASSESSMENT
53M with PMHx HTN, DM2, gout, HLD, hx H. pylori 2/2022 s/p treatment admitted for symptomatic anemia and melena possibly 2/2 GI bleed vs iron deficiency anemia. Melanotic stools have stopped. EGD today was unremarkable. Colonoscopy is recommended however pt would like to pursue that outpatient. Given hemodynamically stability and resolution of bleed, pt okay for discharge with follow up colonoscopy.

## 2023-07-13 NOTE — PROGRESS NOTE ADULT - PROBLEM SELECTOR PLAN 3
4-5 double shot drinks daily, last 7/10. Denies hx ETOH withdrawal. No hx varices. Current CIWA 0  -symptom triggered CIWA for now  -Will provide referral to [facility] for EtOH cessation service

## 2023-07-13 NOTE — PROGRESS NOTE ADULT - SUBJECTIVE AND OBJECTIVE BOX
PROGRESS NOTE:   Authored by Abimbola Snyder MD PGY-1  Internal Medicine      Patient is a 53y old  Male who presents with a chief complaint of melena, anemia on outpatient labs (12 Jul 2023 10:06)      SUBJECTIVE / OVERNIGHT EVENTS: NAEO. Pt has no new complaints or symptoms. He was seen and examined at bedside. No chest pain, SOB, abdominal pain, palpitations, LE edema.    MEDICATIONS  (STANDING):  allopurinol 300 milliGRAM(s) Oral daily  dextrose 5%. 1000 milliLiter(s) (50 mL/Hr) IV Continuous <Continuous>  dextrose 5%. 1000 milliLiter(s) (100 mL/Hr) IV Continuous <Continuous>  dextrose 50% Injectable 25 Gram(s) IV Push once  dextrose 50% Injectable 12.5 Gram(s) IV Push once  dextrose 50% Injectable 25 Gram(s) IV Push once  folic acid 1 milliGRAM(s) Oral daily  glucagon  Injectable 1 milliGRAM(s) IntraMuscular once  insulin lispro (ADMELOG) corrective regimen sliding scale   SubCutaneous every 6 hours  multivitamin 1 Tablet(s) Oral daily  pantoprazole Infusion 8 mG/Hr (10 mL/Hr) IV Continuous <Continuous>  thiamine 100 milliGRAM(s) Oral daily    MEDICATIONS  (PRN):  acetaminophen     Tablet .. 650 milliGRAM(s) Oral every 6 hours PRN Temp greater or equal to 38C (100.4F), Mild Pain (1 - 3)  dextrose Oral Gel 15 Gram(s) Oral once PRN Blood Glucose LESS THAN 70 milliGRAM(s)/deciliter  LORazepam   Injectable 2 milliGRAM(s) IV Push every 2 hours PRN CIWA-Ar score increase by 2 points and a total score of 7 or less  melatonin 3 milliGRAM(s) Oral at bedtime PRN Insomnia      CAPILLARY BLOOD GLUCOSE      POCT Blood Glucose.: 172 mg/dL (13 Jul 2023 11:21)  POCT Blood Glucose.: 176 mg/dL (13 Jul 2023 10:11)  POCT Blood Glucose.: 160 mg/dL (13 Jul 2023 05:56)  POCT Blood Glucose.: 153 mg/dL (12 Jul 2023 21:54)  POCT Blood Glucose.: 141 mg/dL (12 Jul 2023 18:21)        PHYSICAL EXAM:  Vital Signs Last 24 Hrs  T(C): 36.2 (13 Jul 2023 11:09), Max: 37 (12 Jul 2023 20:30)  T(F): 97.1 (13 Jul 2023 11:09), Max: 98.6 (12 Jul 2023 20:30)  HR: 72 (13 Jul 2023 11:39) (71 - 89)  BP: 102/61 (13 Jul 2023 11:39) (102/61 - 122/69)  RR: 18 (13 Jul 2023 11:39) (18 - 26)  SpO2: 99% (13 Jul 2023 11:39) (99% - 100%)    Parameters below as of 13 Jul 2023 11:39  Patient On (Oxygen Delivery Method): room air      CONSTITUTIONAL: Well-groomed, in no apparent distress  RESPIRATORY: Breathing comfortably; no dullness to percussion; lungs CTA without wheeze/rhonchi/rales  CARDIOVASCULAR: +S1S2, RRR, no M/G/R; pedal pulses full and symmetric; no lower extremity edema  GASTROINTESTINAL: No palpable masses or tenderness, +BS throughout, no rebound/guarding; no hepatosplenomegaly; no hernia palpated  SKIN: No rashes or ulcers noted  NEUROLOGIC: A+O x 3, CN II-XII intact; sensation intact in LEs b/l to light touch    LABS:                        7.8    7.57  )-----------( 364      ( 13 Jul 2023 08:15 )             30.4     07-13    137  |  108<H>  |  9   ----------------------------<  154<H>  3.8   |  23  |  0.80    Ca    8.8      13 Jul 2023 08:15  Phos  2.7     07-13  Mg     1.90     07-13    TPro  6.5  /  Alb  4.1  /  TBili  <0.2  /  DBili  x   /  AST  30  /  ALT  26  /  AlkPhos  64  07-13    PT/INR - ( 11 Jul 2023 20:59 )   PT: 11.9 sec;   INR: 1.03 ratio         PTT - ( 11 Jul 2023 20:59 )  PTT:30.0 sec    
PROGRESS NOTE:   Authored by Abimbola Snyder MD PGY-1  Internal Medicine      Patient is a 53y old  Male who presents with a chief complaint of melena, anemia on outpatient labs (12 Jul 2023 10:06)      SUBJECTIVE / OVERNIGHT EVENTS: NAEO. No new symptoms or complaints. Patient seen and examined at bedside. He notes he currently drinks 4-5 double shot drinks a day. Endorses he wants for help with alcohol cessation.     MEDICATIONS  (STANDING):  allopurinol 300 milliGRAM(s) Oral daily  dextrose 5%. 1000 milliLiter(s) (50 mL/Hr) IV Continuous <Continuous>  dextrose 5%. 1000 milliLiter(s) (100 mL/Hr) IV Continuous <Continuous>  dextrose 50% Injectable 25 Gram(s) IV Push once  dextrose 50% Injectable 12.5 Gram(s) IV Push once  dextrose 50% Injectable 25 Gram(s) IV Push once  folic acid 1 milliGRAM(s) Oral daily  glucagon  Injectable 1 milliGRAM(s) IntraMuscular once  insulin lispro (ADMELOG) corrective regimen sliding scale   SubCutaneous three times a day before meals  insulin lispro (ADMELOG) corrective regimen sliding scale   SubCutaneous at bedtime  multivitamin 1 Tablet(s) Oral daily  pantoprazole Infusion 8 mG/Hr (10 mL/Hr) IV Continuous <Continuous>  thiamine 100 milliGRAM(s) Oral daily    MEDICATIONS  (PRN):  acetaminophen     Tablet .. 650 milliGRAM(s) Oral every 6 hours PRN Temp greater or equal to 38C (100.4F), Mild Pain (1 - 3)  dextrose Oral Gel 15 Gram(s) Oral once PRN Blood Glucose LESS THAN 70 milliGRAM(s)/deciliter  LORazepam   Injectable 2 milliGRAM(s) IV Push every 2 hours PRN CIWA-Ar score increase by 2 points and a total score of 7 or less  melatonin 3 milliGRAM(s) Oral at bedtime PRN Insomnia      CAPILLARY BLOOD GLUCOSE      POCT Blood Glucose.: 179 mg/dL (12 Jul 2023 12:10)  POCT Blood Glucose.: 152 mg/dL (12 Jul 2023 08:26)  POCT Blood Glucose.: 141 mg/dL (12 Jul 2023 00:47)  POCT Blood Glucose.: 138 mg/dL (11 Jul 2023 22:37)        PHYSICAL EXAM:  Vital Signs Last 24 Hrs  T(C): 36.7 (12 Jul 2023 16:30), Max: 37.3 (11 Jul 2023 20:00)  T(F): 98 (12 Jul 2023 16:30), Max: 99.1 (11 Jul 2023 20:00)  HR: 80 (12 Jul 2023 16:30) (64 - 98)  BP: 122/69 (12 Jul 2023 16:30) (100/67 - 125/76)  RR: 18 (12 Jul 2023 16:30) (17 - 20)  SpO2: 100% (12 Jul 2023 16:30) (99% - 100%)    Parameters below as of 12 Jul 2023 16:30  Patient On (Oxygen Delivery Method): room air      CONSTITUTIONAL: Well-groomed, in no apparent distress  RESPIRATORY: Breathing comfortably; no dullness to percussion; lungs CTA without wheeze/rhonchi/rales  CARDIOVASCULAR: +S1S2, RRR, no M/G/R; pedal pulses full and symmetric; no lower extremity edema  GASTROINTESTINAL: No palpable masses or tenderness, +BS throughout, no rebound/guarding; no hepatosplenomegaly; no hernia palpated  SKIN: No rashes or ulcers noted  NEUROLOGIC: A+O x 3, CN II-XII intact; sensation intact in LEs b/l to light touch    LABS:                        8.2    9.62  )-----------( 395      ( 11 Jul 2023 20:59 )             31.3     07-11    139  |  101  |  13  ----------------------------<  139<H>  3.6   |  22  |  0.85    Ca    9.5      11 Jul 2023 20:59    TPro  7.1  /  Alb  4.6  /  TBili  <0.2  /  DBili  x   /  AST  19  /  ALT  19  /  AlkPhos  72  07-11    PT/INR - ( 11 Jul 2023 20:59 )   PT: 11.9 sec;   INR: 1.03 ratio         PTT - ( 11 Jul 2023 20:59 )  PTT:30.0 sec

## 2023-07-13 NOTE — DISCHARGE NOTE PROVIDER - NSDCMRMEDTOKEN_GEN_ALL_CORE_FT
allopurinol 300 mg oral tablet: 1 orally once a day  amLODIPine 5 mg oral tablet: 1 orally once a day   allopurinol 300 mg oral tablet: 1 orally once a day  amLODIPine 5 mg oral tablet: 1 orally once a day  ferrous sulfate 325 mg (65 mg elemental iron) oral delayed release tablet: 1 tab(s) orally once a day

## 2023-07-13 NOTE — DISCHARGE NOTE PROVIDER - HOSPITAL COURSE
Mr. Menezes is a 54yo man with hx of HTN, DM2, gout, HLD, hx H. pylori 2/2022 s/p treatment who was admitted for symptomatic anemia and melena 2/2 GI bleed after NSAID use and chronic alcohol use. His hgb was 8.2 on presentation. Has not required a transfusion. His melanotic stools stopped 7/13. EGD 7/13 was unremarkable. GI team recommended colonoscopy however preferred to complete that outpatient. Given hemodynamically stability and resolution of bleed, pt okay for discharge with follow up colonoscopy. He has hx of alcohol use disorder and would like assistance with cessation. He was referred to [facility].

## 2023-07-13 NOTE — DISCHARGE NOTE PROVIDER - NSDCCPTREATMENT_GEN_ALL_CORE_FT
PRINCIPAL PROCEDURE  Procedure: EGD  Findings and Treatment: Procedure:           Upper GI endoscopy                                                                                   Findings:       The Z-line was found 41 cm from the incisors.       A 4 cm hiatal hernia was present.       The gastroesophageal flap valve was visualized endoscopically and        classified as Hill Grade III (minimal fold, loose to endoscope, hiatal        hernia likely).       The examined esophagus was otherwise normal.       The entire examined stomach was normal.      Patchy mildly erythematous mucosa without active bleeding and with no        stigmata of bleeding was found in the first portion of the duodenum. No        biopsies or other specimens were collected for this exam.       The duodenal bulb and second portion of the duodenum were normal.                                                                                   Impression:          - Z-line, 41 cm from the incisors.                       - 4 cm hiatal hernia.                       - Gastroesophageal flap valve classified as Hill Grade                        III (minimal fold, loose to endoscope, hiatal hernia                        likely).                       - Normal esophagus.                       - Normal stomach.          - Erythematous duodenopathy. No specimens collected.                       - Normal duodenal bulb and second portion of the                        duodenum.  Recommendation:      - [facility] patient to [facility] for [facility].            - Perform a colonoscopy tomorrow vs outpatient (at                        patients preference)

## 2023-07-13 NOTE — PROGRESS NOTE ADULT - PROBLEM SELECTOR PLAN 2
In the setting of GI bleed and iron deficiency  -Stable  -Melanotic stools resolved  -Will discharge pt on ferrous sulfate 325 qd

## 2023-07-13 NOTE — DISCHARGE NOTE PROVIDER - NSDCCPCAREPLAN_GEN_ALL_CORE_FT
PRINCIPAL DISCHARGE DIAGNOSIS  Diagnosis: Melena  Assessment and Plan of Treatment: The digestive or gastrointestinal tract goes from the mouth to the anus. It is often called the GI tract.  Bleeding in the upper GI tract can happen anywhere from the esophagus to the first part of the small intestine. Sometimes it's caused by an ulcer in your stomach. Or it may be caused by blood vessels in your esophagus. Your esophagus is the tube that carries food from your throat to your stomach.  Light bleeding may not cause any symptoms at first. But if you continue to bleed for a while, you may feel very weak or tired.  Sudden, heavy bleeding means you need to see a doctor right away. This kind of bleeding can be very dangerous. But it can usually be cured or controlled. The doctor may do some tests to find the cause of your bleeding.  Do not take aspirin or other anti-inflammatory medicines, such as naproxen (Aleve) or ibuprofen (Advil, Motrin), without talking to your doctor first. Ask your doctor if it is okay to use acetaminophen (Tylenol).  Do not drink alcohol.  The bleeding may make you lose iron. So it's important to eat foods that have a lot of iron. These include red meat, shellfish, poultry, and eggs. They also include beans, raisins, whole-grain breads, and leafy green vegetables. If you want help planning meals, you can meet with a dietitian.  Call 911 anytime you think you may need emergency care. For example, call if:  You have sudden, severe belly pain.  You vomit blood or what looks like coffee grounds.  You passed out (lost consciousness).  Your stools are maroon or very bloody.  You are dizzy or light-headed, or you feel like you may faint.  Your stools are black and look like tar.  You have belly pain.  You vomit or have nausea.  You have trouble swallowing, or it hurts when you swallow.

## 2023-07-13 NOTE — DISCHARGE NOTE NURSING/CASE MANAGEMENT/SOCIAL WORK - NSDCPEEMAIL_GEN_ALL_CORE
[facility] for Tobacco Control email [email or web address]

## 2023-07-13 NOTE — DISCHARGE NOTE NURSING/CASE MANAGEMENT/SOCIAL WORK - NSDCPEWEB_GEN_ALL_CORE
[facility] for Tobacco Control website --- [email or web address] website --- [email or web address]

## 2023-07-13 NOTE — PROGRESS NOTE ADULT - PROBLEM SELECTOR PLAN 8
No chemical DVT ppx given GIB  DASH/TLC CC diet
No chemical DVT ppx given GIB  DASH/TLC CC diet
[FreeTextEntry1] : Blood work results, imaging studies, and pathology reports reviewed in detail with patient .\par \par

## 2023-07-13 NOTE — DISCHARGE NOTE PROVIDER - NSDCFUADDAPPT_GEN_ALL_CORE_FT
[facility] will call you for services regarding alcohol cessation.   If they do not call you in the next few days, call them at [phone]

## 2023-07-13 NOTE — CHART NOTE - NSCHARTNOTEFT_GEN_A_CORE
Patient is s/p EGD on 7/13, and no source of bleed was identified. Patient declined inpatient colonoscopy and preferred to proceed with his own GI doctor outpatient.   If patient changes mind, please call GI team back.   If patient prefers to follow up with our GI clinic, please provide patient with Gastroenterology Clinic to confirm appointment; [phone] ([facility] at [address] [place] [address]) or [phone] ([facility] at [address] [place] [address]) or [phone] ([facility] at [address] [facility]).    Cyndee Whitmore  GI/Hepatology Fellow    MONDAY-FRIDAY 8AM-5PM:  Pager# [phone] ([facility]) or [phone] ([facility])    NON-URGENT CONSULTS:  Please email [email or web address] OR [email or web address]  AT NIGHT AND ON WEEKENDS:  Contact on-call GI fellow via answering service ([phone]) from 5pm-8am and on weekends/holidays

## 2023-07-13 NOTE — DISCHARGE NOTE PROVIDER - CARE PROVIDER_API CALL
Satish Amin  Gastroenterology  [address] [place] [address]  Phone: [phone]  Fax: [phone]  Scheduled Appointment: 07/17/2023 10:00 AM

## 2023-07-13 NOTE — DISCHARGE NOTE NURSING/CASE MANAGEMENT/SOCIAL WORK - PATIENT PORTAL LINK FT
You can access the FollowMyHealth Patient Portal offered by [facility] by registering at the following website: [email or web address]. By joining [facility]’s FollowMyHealth portal, you will also be able to view your health information using other applications (apps) compatible with our system.

## 2023-07-18 NOTE — DISCHARGE NOTE NURSING/CASE MANAGEMENT/SOCIAL WORK - NSDCFUADDAPPT_GEN_ALL_CORE_FT
[facility] will call you for services regarding alcohol cessation.   If they do not call you in the next few days, call them at [phone]
36.7

## 2023-08-08 NOTE — ED PROVIDER NOTE - DATE/TIME 5
13-Sep-2019 08:35 Zyclara Pregnancy And Lactation Text: This medication is Pregnancy Category C. It is unknown if this medication is excreted in breast milk.

## 2024-02-19 NOTE — ED PROVIDER NOTE - NEUROLOGICAL, MLM
Writer met with patient for brief check in. Writer discussed attendance expectations and notified the patient that frequent tardiness and leaving group early will result in discharge . The patient was notified that he is at risk of discharge if he misses another day/date of group. Patient provided verbal understanding of this.Writer  also reviewed treatment plan with the patient.   
Alert and oriented, no focal deficits, no motor or sensory deficits.

## 2024-06-07 NOTE — H&P PST ADULT - NS HIV RISK FACTOR
Patient completed Session 2 of Phase II Monitored Cardiac Rehabilitation. Please see Media Tab for scanned in Exercise Session Report.  
Yes

## 2024-07-31 NOTE — PATIENT PROFILE ADULT - FUNCTIONAL SCREEN CURRENT LEVEL: COMMUNICATION, MLM
Patient:  Darrius FloresBASIL 1956  Date of Service:  24      Patient presents to [facility] with complaints of back and bilateral leg pain     Pain:  Location: back and bilateral leg   Inciting Factor: none  Duration: 1 month  Description: constant  Quality: burning and sharp.    Level (worst): 10/10  Radiation:  yes - bilateral leg  Numbness/Tingling: no  Aggravating factors: standing.   Alleviating factors: nothing.    Blood Thinners/Anticoagulation: Aspirin   If so, managed by: Adrián Orantes DO or DR. Marc  Herbal Supplements: no    Medications:    NSAID's :    Tylenol: No   Patches/Gels:     Membrane stabilizers :   Current -    Previous -   Opioids :   Current -  hydrocodone/acetaminophen (Hycet, Lorcet, Lortab, Norco, Vicodin)   Previous -      Muscle Relaxants:     Steroids: no   Benzodiazepines:    Anti-depres/Anti-Pscyh:    Adjuvants or Others : no      Previous treatments:    Physical Therapy : No    Chiropractic treatment: No   TENS Unit: No   Previous Pain Physicians: no   Previous Procedure: no     BP (!) 148/89   Pulse 78   Temp 97.7 °F (36.5 °C) (Infrared)   Resp 16   Ht 1.803 m (5' 11\")   Wt 97.5 kg (215 lb)   SpO2 96%   BMI 29.99 kg/m²     No LMP for male patient.   0 = understands/communicates without difficulty

## 2024-09-23 ENCOUNTER — EMERGENCY (EMERGENCY)
Facility: HOSPITAL | Age: 54
LOS: 0 days | Discharge: HOME | End: 2024-09-23
Attending: EMERGENCY MEDICINE
Payer: COMMERCIAL

## 2024-09-23 VITALS
OXYGEN SATURATION: 98 % | SYSTOLIC BLOOD PRESSURE: 99 MMHG | TEMPERATURE: 98 F | RESPIRATION RATE: 17 BRPM | DIASTOLIC BLOOD PRESSURE: 69 MMHG | HEIGHT: 71 IN | HEART RATE: 85 BPM | WEIGHT: 216.93 LBS

## 2024-09-23 VITALS
OXYGEN SATURATION: 99 % | DIASTOLIC BLOOD PRESSURE: 81 MMHG | TEMPERATURE: 98 F | SYSTOLIC BLOOD PRESSURE: 119 MMHG | HEART RATE: 75 BPM | RESPIRATION RATE: 18 BRPM

## 2024-09-23 DIAGNOSIS — K62.89 OTHER SPECIFIED DISEASES OF ANUS AND RECTUM: ICD-10-CM

## 2024-09-23 DIAGNOSIS — E11.9 TYPE 2 DIABETES MELLITUS WITHOUT COMPLICATIONS: ICD-10-CM

## 2024-09-23 DIAGNOSIS — R11.2 NAUSEA WITH VOMITING, UNSPECIFIED: ICD-10-CM

## 2024-09-23 DIAGNOSIS — R19.7 DIARRHEA, UNSPECIFIED: ICD-10-CM

## 2024-09-23 DIAGNOSIS — I10 ESSENTIAL (PRIMARY) HYPERTENSION: ICD-10-CM

## 2024-09-23 DIAGNOSIS — R42 DIZZINESS AND GIDDINESS: ICD-10-CM

## 2024-09-23 DIAGNOSIS — R19.5 OTHER FECAL ABNORMALITIES: ICD-10-CM

## 2024-09-23 DIAGNOSIS — Z98.890 OTHER SPECIFIED POSTPROCEDURAL STATES: ICD-10-CM

## 2024-09-23 DIAGNOSIS — E78.5 HYPERLIPIDEMIA, UNSPECIFIED: ICD-10-CM

## 2024-09-23 PROBLEM — E11.65 TYPE 2 DIABETES MELLITUS WITH HYPERGLYCEMIA: Chronic | Status: ACTIVE | Noted: 2023-07-12

## 2024-09-23 LAB
ALBUMIN SERPL ELPH-MCNC: 3.8 G/DL — SIGNIFICANT CHANGE UP (ref 3.3–5)
ALP SERPL-CCNC: 71 U/L — SIGNIFICANT CHANGE UP (ref 40–120)
ALT FLD-CCNC: 64 U/L — SIGNIFICANT CHANGE UP (ref 12–78)
ANION GAP SERPL CALC-SCNC: 5 MMOL/L — SIGNIFICANT CHANGE UP (ref 5–17)
AST SERPL-CCNC: 47 U/L — HIGH (ref 15–37)
BASOPHILS # BLD AUTO: 0.03 K/UL — SIGNIFICANT CHANGE UP (ref 0–0.2)
BASOPHILS NFR BLD AUTO: 0.3 % — SIGNIFICANT CHANGE UP (ref 0–2)
BILIRUB SERPL-MCNC: 0.6 MG/DL — SIGNIFICANT CHANGE UP (ref 0.2–1.2)
BLD GP AB SCN SERPL QL: SIGNIFICANT CHANGE UP
BUN SERPL-MCNC: 10 MG/DL — SIGNIFICANT CHANGE UP (ref 7–23)
CALCIUM SERPL-MCNC: 9.4 MG/DL — SIGNIFICANT CHANGE UP (ref 8.5–10.1)
CHLORIDE SERPL-SCNC: 107 MMOL/L — SIGNIFICANT CHANGE UP (ref 96–108)
CO2 SERPL-SCNC: 26 MMOL/L — SIGNIFICANT CHANGE UP (ref 22–31)
CREAT SERPL-MCNC: 1.11 MG/DL — SIGNIFICANT CHANGE UP (ref 0.5–1.3)
EGFR: 80 ML/MIN/1.73M2 — SIGNIFICANT CHANGE UP
EOSINOPHIL # BLD AUTO: 0.04 K/UL — SIGNIFICANT CHANGE UP (ref 0–0.5)
EOSINOPHIL NFR BLD AUTO: 0.4 % — SIGNIFICANT CHANGE UP (ref 0–6)
GLUCOSE SERPL-MCNC: 281 MG/DL — HIGH (ref 70–99)
HCT VFR BLD CALC: 46.8 % — SIGNIFICANT CHANGE UP (ref 39–50)
HGB BLD-MCNC: 15 G/DL — SIGNIFICANT CHANGE UP (ref 13–17)
IMM GRANULOCYTES NFR BLD AUTO: 0.1 % — SIGNIFICANT CHANGE UP (ref 0–0.9)
LIDOCAIN IGE QN: 29 U/L — SIGNIFICANT CHANGE UP (ref 13–75)
LYMPHOCYTES # BLD AUTO: 2.54 K/UL — SIGNIFICANT CHANGE UP (ref 1–3.3)
LYMPHOCYTES # BLD AUTO: 27.6 % — SIGNIFICANT CHANGE UP (ref 13–44)
MAGNESIUM SERPL-MCNC: 2.1 MG/DL — SIGNIFICANT CHANGE UP (ref 1.6–2.6)
MCHC RBC-ENTMCNC: 26.3 PG — LOW (ref 27–34)
MCHC RBC-ENTMCNC: 32.1 G/DL — SIGNIFICANT CHANGE UP (ref 32–36)
MCV RBC AUTO: 82 FL — SIGNIFICANT CHANGE UP (ref 80–100)
MONOCYTES # BLD AUTO: 0.73 K/UL — SIGNIFICANT CHANGE UP (ref 0–0.9)
MONOCYTES NFR BLD AUTO: 7.9 % — SIGNIFICANT CHANGE UP (ref 2–14)
NEUTROPHILS # BLD AUTO: 5.85 K/UL — SIGNIFICANT CHANGE UP (ref 1.8–7.4)
NEUTROPHILS NFR BLD AUTO: 63.7 % — SIGNIFICANT CHANGE UP (ref 43–77)
NRBC # BLD: 0 /100 WBCS — SIGNIFICANT CHANGE UP (ref 0–0)
OB PNL STL: POSITIVE
PLATELET # BLD AUTO: 344 K/UL — SIGNIFICANT CHANGE UP (ref 150–400)
POTASSIUM SERPL-MCNC: 4.2 MMOL/L — SIGNIFICANT CHANGE UP (ref 3.5–5.3)
POTASSIUM SERPL-SCNC: 4.2 MMOL/L — SIGNIFICANT CHANGE UP (ref 3.5–5.3)
PROT SERPL-MCNC: 7.8 GM/DL — SIGNIFICANT CHANGE UP (ref 6–8.3)
RBC # BLD: 5.71 M/UL — SIGNIFICANT CHANGE UP (ref 4.2–5.8)
RBC # FLD: 19.7 % — HIGH (ref 10.3–14.5)
SODIUM SERPL-SCNC: 138 MMOL/L — SIGNIFICANT CHANGE UP (ref 135–145)
WBC # BLD: 9.2 K/UL — SIGNIFICANT CHANGE UP (ref 3.8–10.5)
WBC # FLD AUTO: 9.2 K/UL — SIGNIFICANT CHANGE UP (ref 3.8–10.5)

## 2024-09-23 PROCEDURE — 99285 EMERGENCY DEPT VISIT HI MDM: CPT

## 2024-09-23 PROCEDURE — 74177 CT ABD & PELVIS W/CONTRAST: CPT | Mod: 26,MC

## 2024-09-23 PROCEDURE — 93010 ELECTROCARDIOGRAM REPORT: CPT

## 2024-09-23 RX ORDER — PANTOPRAZOLE SODIUM 40 MG
40 TABLET, DELAYED RELEASE (ENTERIC COATED) ORAL ONCE
Refills: 0 | Status: COMPLETED | OUTPATIENT
Start: 2024-09-23 | End: 2024-09-23

## 2024-09-23 RX ORDER — KETOROLAC TROMETHAMINE 30 MG/ML
15 INJECTION, SOLUTION INTRAMUSCULAR ONCE
Refills: 0 | Status: DISCONTINUED | OUTPATIENT
Start: 2024-09-23 | End: 2024-09-23

## 2024-09-23 RX ORDER — CHLORDIAZEPOXIDE HCL 5 MG
25 CAPSULE ORAL ONCE
Refills: 0 | Status: DISCONTINUED | OUTPATIENT
Start: 2024-09-23 | End: 2024-09-23

## 2024-09-23 RX ORDER — DICYCLOMINE HCL 20 MG
20 TABLET ORAL ONCE
Refills: 0 | Status: COMPLETED | OUTPATIENT
Start: 2024-09-23 | End: 2024-09-23

## 2024-09-23 RX ORDER — METOCLOPRAMIDE HCL 5 MG
10 TABLET ORAL ONCE
Refills: 0 | Status: COMPLETED | OUTPATIENT
Start: 2024-09-23 | End: 2024-09-23

## 2024-09-23 RX ORDER — SODIUM CHLORIDE 9 MG/ML
1000 INJECTION INTRAMUSCULAR; INTRAVENOUS; SUBCUTANEOUS ONCE
Refills: 0 | Status: COMPLETED | OUTPATIENT
Start: 2024-09-23 | End: 2024-09-23

## 2024-09-23 RX ORDER — ONDANSETRON 2 MG/ML
4 INJECTION, SOLUTION INTRAMUSCULAR; INTRAVENOUS ONCE
Refills: 0 | Status: COMPLETED | OUTPATIENT
Start: 2024-09-23 | End: 2024-09-23

## 2024-09-23 RX ADMIN — SODIUM CHLORIDE 1000 MILLILITER(S): 9 INJECTION INTRAMUSCULAR; INTRAVENOUS; SUBCUTANEOUS at 10:01

## 2024-09-23 RX ADMIN — KETOROLAC TROMETHAMINE 15 MILLIGRAM(S): 30 INJECTION, SOLUTION INTRAMUSCULAR at 13:55

## 2024-09-23 RX ADMIN — Medication 10 MILLIGRAM(S): at 12:35

## 2024-09-23 RX ADMIN — SODIUM CHLORIDE 1000 MILLILITER(S): 9 INJECTION INTRAMUSCULAR; INTRAVENOUS; SUBCUTANEOUS at 12:35

## 2024-09-23 RX ADMIN — Medication 40 MILLIGRAM(S): at 10:01

## 2024-09-23 RX ADMIN — ONDANSETRON 4 MILLIGRAM(S): 2 INJECTION, SOLUTION INTRAMUSCULAR; INTRAVENOUS at 10:04

## 2024-09-23 RX ADMIN — Medication 20 MILLIGRAM(S): at 13:55

## 2024-09-23 RX ADMIN — Medication 25 MILLIGRAM(S): at 10:20

## 2024-09-23 NOTE — ED ADULT NURSE NOTE - OBJECTIVE STATEMENT
Pt presents to ED A&Ox3 ambulatory with equal steady gait c/o lower abd pain, dizziness and diarrhea that began this morning Pt presents to ED A&Ox3 ambulatory with equal steady gait c/o mid abd pain, dizziness and diarrhea that began this morning at 6 am. Pt endorses drinking daily, pt last reports drink was yesterday, which was 1 pint of vodka. Pt connected to portable cardiac monitor.

## 2024-09-23 NOTE — ED PROVIDER NOTE - PATIENT PORTAL LINK FT
You can access the FollowMyHealth Patient Portal offered by [facility] by registering at the following website: [email or web address]. By joining [facility]’s FollowMyHealth portal, you will also be able to view your health information using other applications (apps) compatible with our system.

## 2024-09-23 NOTE — ED PROVIDER NOTE - RECTAL
- recommend follow up with Primary care provider or to transfer care to our clinic for further asthma management, should most likely be on low dose inhaled corticosteroid  - discuss smoking cessation with primary care provider, prediabetes care, can transfer care to our service if you would like, would need hour long new patient appointment  - ekg reassuring, achieve stress test   non-tender

## 2024-09-23 NOTE — ED PROVIDER NOTE - CARE PROVIDER_API CALL
Feliciano Bess  Gastroenterology  [address], [address]  [place] [address]  Phone: [phone]  Fax: [phone]  Follow Up Time: 1-3 Days    your pmd in 1-3 days,   Phone: (   )    -  Fax: (   )    -  Follow Up Time:

## 2024-09-23 NOTE — ED PROVIDER NOTE - CARE PLAN
1 Principal Discharge DX:	Nausea, vomiting and diarrhea  Secondary Diagnosis:	Rectal lesion  Secondary Diagnosis:	Alcohol abuse  Secondary Diagnosis:	Occult blood in stools

## 2024-09-23 NOTE — ED PROVIDER NOTE - CONSTITUTIONAL, MLM
Well appearing, awake, alert, oriented to person, place, time/situation and in no apparent distress. Speaking in clear full sentences no nasal flaring no shoulders retractions no diaphoresis, no acitve vomiting not holding his head/chest/abdomne, appears very comfortable normal...

## 2024-09-23 NOTE — ED ADULT NURSE NOTE - NSFALLUNIVINTERV_ED_ALL_ED
Bed/Stretcher in lowest position, wheels locked, appropriate side rails in place/Call bell, personal items and telephone in reach/Instruct patient to call for assistance before getting out of bed/chair/stretcher/Non-slip footwear applied when patient is off stretcher/[place] to call system/Physically safe environment - no spills, clutter or unnecessary equipment/Purposeful proactive rounding/Room/bathroom lighting operational, light cord in reach

## 2024-09-23 NOTE — ED PROVIDER NOTE - PROVIDER TOKENS
PROVIDER:[TOKEN:[[id number]],FOLLOWUP:[1-3 Days]],FREE:[LAST:[your pmd in 1-3 days],PHONE:[(   )    -],FAX:[(   )    -]]

## 2024-09-23 NOTE — ED PROVIDER NOTE - CARE PROVIDERS DIRECT ADDRESSES
[email or web address]

## 2024-09-23 NOTE — ED PROVIDER NOTE - CLINICAL SUMMARY MEDICAL DECISION MAKING FREE TEXT BOX
pt walked in c/o nausea vomiting then diffused abd pain and light yellow non bloody watery diarrhea since this morning. Pt admits drinking vodka about one pine per day daily last drink was last night. According to [facility] medical record pt had Melena and was admitted in July of 2023. ekg normal  sinus rhythm at 86 bpm pt walked in c/o nausea vomiting ( yellow vomitus) then diffused abd pain and light yellow non bloody watery diarrhea since this morning. Pt admits drinking vodka about one pine per day daily last drink was last night. According to [facility] medical record pt had Melena and was admitted on July, 11 of 2023. ekg normal  sinus rhythm at 86 bpm no changed from old done on 7/11/2023. Pt denies headache, recent hx of traveling, trauma, blurred visions, light sensitivities, focal/distal weakness or numbness, neck/back/hips/calfs pain, difficulty to walk or keep balance, cough, sob, chest pain, fever, chills, dysuria, hematuria or abnormal stool color. Labs, ct of abd/pelvis, ivf, and ciwa are ordered. Pt appears very comfortable while lying in the stretcher and pt got up walked with normal gaits without assists. no focal neuro deficits on examine.   CIWA 6 pt walked in c/o nausea vomiting ( yellow vomitus) then diffused abd pain and light yellow non bloody watery diarrhea since this morning. Pt admits drinking vodka about one pine per day daily last drink was last night. According to [facility] medical record pt had Melena and was admitted on July, 11 of 2023. ekg normal  sinus rhythm at 86 bpm no changed from old done on 7/11/2023. Pt denies headache, recent hx of traveling, trauma, blurred visions, light sensitivities, focal/distal weakness or numbness, neck/back/hips/calfs pain, difficulty to walk or keep balance, cough, sob, chest pain, fever, chills, dysuria, hematuria or abnormal stool color. Labs, ct of abd/pelvis, ivf, and ciwa are ordered. Pt appears very comfortable while lying in the stretcher and pt got up walked with normal gaits without assists. no focal neuro deficits on examine. according to [facility] medical record h/h were 7,8/30.4 on 7/13/24  CIWA 6 pt walked in c/o nausea vomiting ( yellow vomitus) then diffused abd pain and light yellow non bloody watery diarrhea since this morning. Pt admits drinking vodka about one pine per day daily last drink was last night. According to [facility] medical record pt had Melena and was admitted on July, 11 of 2023. ekg normal  sinus rhythm at 86 bpm no changed from old done on 7/11/2023. Pt denies headache, recent hx of traveling, trauma, blurred visions, light sensitivities, focal/distal weakness or numbness, neck/back/hips/calfs pain, difficulty to walk or keep balance, cough, sob, chest pain, fever, chills, dysuria, hematuria or abnormal stool color. Labs, ct of abd/pelvis, ivf, and ciwa are ordered. Pt appears very comfortable while lying in the stretcher and pt got up walked with normal gaits without assists. no focal neuro deficits on examine. according to [facility] medical record h/h were 7,8/30.4 on 7/13/24  CIWA 6    PA Ton: on discharge- @243pm-- 54-year-old male with PMH alcohol abuse, HTN, HLD, diabetes presents with nausea, multiple episodes of nonbloody nonbilious vomiting, multiple episodes of nonbloody diarrhea with abdominal cramping x yesterday.  Unsure if he ate something bad.  He also relates that he feels a little lightheaded.  He denies any history of alcohol withdrawal seizures, hospitalizations for alcohol withdrawal.  He does have a history of positive Hemoccult, had a colonoscopy done last year which was normal per patient.   on exa:  GENERAL: Alert, appears stated age, well appearing, non-toxic  SKIN: Warm, and dry.   HEAD: NC, AT   EYE: Normal lids/conjunctiva,  ENT: Normal hearing, patent oropharynx  NECK: +supple. No meningismus, or JVD  Pulm: Bilateral BS, normal resp effort, no wheezes, stridor, or retractions  CV: RRR, no M/R/G, 2+and = radial pulses  Abd: soft, non-tender, non-distended, no rebound/guarding. no CVA tenderness.   Mskel: no erythema, cyanosis, edema. no calf tenderness  Neuro: AAOx3, no sensory/motor deficits, CN 2-12 intact. No speech slurring, pronator drift, facial asymmetry. normal finger-to-nose b/l. 5/5 strength throughout. normal gait.   DDx includes colitis, gastroenteritis, diverticulitis, gastritis.  Lab work nonactionable-he again had a positive Hemoccult however no gross blood in stool as per Dr. Trotter's exam.  There is also a hypodensity in the rectal wall which I discussed with patient.  He understands severity and will follow-up with GI for this for repeat colonoscopy.  Feels much better.  Tolerating p.o.  Repeat abdominal exam remains nontender.  Reviewed all results and necessity for follow up. Counseled on red flags and to return for them.  Patient appears well on discharge.  ciwa 0 on discharge

## 2024-09-23 NOTE — ED ADULT TRIAGE NOTE - CHIEF COMPLAINT QUOTE
Patient presents to ED c/o lower abdominal pain, diarrhea and dizziness starting this morning. Tried drinking roshan tea but unable to tolerate anything by mouth,  hx HTN

## 2025-05-05 NOTE — ED ADULT NURSE NOTE - NS ED NOTE ABUSE SUSPICION NEGLECT YN
I called patient and began to inform that she does not have an appt w/Dr. Sheffield this wk at 10:00 am.  The call dropped.  I called back and left a detailed voicemail msg informing her that her next appt is 7/1/25 and asked her to call back if she would like to schedule her AWV.        
No

## (undated) DEVICE — BIOPSY FORCEP RADIAL JAW 4 STANDARD WITH NEEDLE

## (undated) DEVICE — DRSG 2X2

## (undated) DEVICE — LUBRICATING JELLY HR ONE SHOT 3G

## (undated) DEVICE — DRSG BANDAID 0.75X3"

## (undated) DEVICE — PACK IV START WITH CHG

## (undated) DEVICE — BIOPSY FORCEP COLD DISP

## (undated) DEVICE — DRSG CURITY GAUZE SPONGE 4 X 4" 12-PLY NON-STERILE

## (undated) DEVICE — TUBING IV SET GRAVITY 3Y 100" MACRO

## (undated) DEVICE — TUBING MEDI-VAC W MAXIGRIP CONNECTORS 1/4"X6'

## (undated) DEVICE — UNDERPAD LINEN SAVER 17 X 24"

## (undated) DEVICE — BASIN EMESIS 10IN GRADUATED MAUVE

## (undated) DEVICE — SALIVA EJECTOR (BLUE)

## (undated) DEVICE — GOWN LG

## (undated) DEVICE — BITE BLOCK ADULT 20 X 27MM (GREEN)

## (undated) DEVICE — CATH IV SAFE BC 22G X 1" (BLUE)

## (undated) DEVICE — ELCTR ECG CONDUCTIVE ADHESIVE

## (undated) DEVICE — LINE BREATHE SAMPLNG

## (undated) DEVICE — DENTURE CUP PINK

## (undated) DEVICE — CONTAINER FORMALIN 80ML YELLOW

## (undated) DEVICE — CLAMP BX HOT RAD JAW 3